# Patient Record
Sex: MALE | Race: BLACK OR AFRICAN AMERICAN | Employment: FULL TIME | URBAN - METROPOLITAN AREA
[De-identification: names, ages, dates, MRNs, and addresses within clinical notes are randomized per-mention and may not be internally consistent; named-entity substitution may affect disease eponyms.]

---

## 2021-07-11 NOTE — PROGRESS NOTES
Olivia Ron   63 y.o. male   1965    This is patient's first visit with me. Olivia Ron is here to establish care as their new PCP. Olivia Ron has a PMH significant for: none    Reason(s) for visit:   Chief Complaint   Patient presents with    Annual Exam    Established New Doctor       HPI:    Chart review:  -Nothing on file. Office visit encounter:    Patient is here for physical.  Regarding his occupation. Patient operates a tow truck. He stated that he had a  DOT over a year ago. Patient stated he has had intermittent issues of initiating urinary stream, but no issues with incomplete bladder emptying, dysuria, hematuria, incontinence, etc.    Regarding colon CA, no FMH of colon CA. He had at least two c-scope in his 51's in [facility]. No issues with hematochezia or melena. Patient stated that he got his COVID-19 vaccine about 4-5 months ago. No hx of confirmed COVID-19. PDMP monitoring:  -Revealed no controlled medications written of any kind.    -Last report:   Last PDMP [place] as Reviewed [facility]):  Review User Review Instant Review Result   [address] [address], [address] 7/11/2021  1:23 PM Reviewed PDMP [1]       Allergies   Allergen Reactions    Atorvastatin      Other reaction(s): Muscle Aches       No current outpatient medications on file prior to visit. No current facility-administered medications on file prior to visit.         Family History   Problem Relation Age of Onset    Breast Cancer Mother        Social History     Tobacco Use    Smoking status: Never Smoker    Smokeless tobacco: Never Used   Substance Use Topics    Alcohol use: Yes        No results found for: WBC, HGB, HCT, MCV, PLT    Chemistry    No results found for: NA, K, CL, CO2, BUN, CREATININE No results found for: CALCIUM, ALKPHOS, AST, ALT, BILITOT       No results found for: ALT, AST, GGT, ALKPHOS, BILITOT  No results found for: LABA1C  No results found for: EAG    Review of Systems Constitutional: Negative for activity change, appetite change, fatigue, fever and unexpected weight change. HENT: Negative for congestion, rhinorrhea, sinus pressure and trouble swallowing. Respiratory: Negative for cough, chest tightness, shortness of breath and wheezing. Cardiovascular: Negative for chest pain, palpitations and leg swelling. Gastrointestinal: Negative for abdominal distention, abdominal pain, blood in stool, constipation, diarrhea, nausea and vomiting. Genitourinary: Positive for difficulty urinating (intermittent). Negative for dysuria, frequency and hematuria. Musculoskeletal: Negative for arthralgias and back pain. Skin: Negative for rash. Neurological: Negative for dizziness, weakness, light-headedness, numbness and headaches. Psychiatric/Behavioral: Negative for sleep disturbance. The patient is not nervous/anxious. Wt Readings from Last 3 Encounters:   07/14/21 225 lb 9.6 oz (102.3 kg)       BP Readings from Last 3 Encounters:   07/14/21 138/84       Pulse Readings from Last 3 Encounters:   07/14/21 70       /84   Pulse 70   Temp 97.4 °F (36.3 °C)   Ht 5' 8\" (1.727 m)   Wt 225 lb 9.6 oz (102.3 kg)   SpO2 96%   BMI 34.30 kg/m²      Physical Exam  Vitals reviewed. Constitutional:       General: He is awake. He is not in acute distress. Appearance: He is obese. He is not ill-appearing or diaphoretic. HENT:      Head: Normocephalic and atraumatic. No abrasion or masses. Hair is normal.      Right Ear: External ear normal.      Left Ear: External ear normal.      Nose: Nose normal.   Eyes:      General: Lids are normal. Gaze aligned appropriately. No scleral icterus. Right eye: No discharge. Left eye: No discharge. Extraocular Movements: Extraocular movements intact. Conjunctiva/sclera: Conjunctivae normal.   Neck:      Trachea: Phonation normal.   Cardiovascular:      Rate and Rhythm: Normal rate and regular rhythm. Pulmonary:      Effort: Pulmonary effort is normal. No respiratory distress. Breath sounds: No wheezing, rhonchi or rales. Abdominal:      General: Abdomen is flat. There is no distension. Palpations: Abdomen is soft. Musculoskeletal:         General: No deformity. Normal range of motion. Cervical back: Normal range of motion. No erythema. Right lower leg: No edema. Left lower leg: No edema. Skin:     Coloration: Skin is not cyanotic, jaundiced or pale. Findings: No abrasion, abscess, bruising, ecchymosis, erythema, signs of injury, laceration, lesion, petechiae, rash or wound. Neurological:      General: No focal deficit present. Mental Status: He is alert. Mental status is at baseline. GCS: GCS eye subscore is 4. GCS verbal subscore is 5. GCS motor subscore is 6. Cranial Nerves: No cranial nerve deficit, dysarthria or facial asymmetry. Motor: No weakness, tremor, atrophy or seizure activity. Coordination: Coordination normal.      Gait: Gait is intact. Psychiatric:         Attention and Perception: Attention and perception normal.         Mood and Affect: Mood and affect normal.         Speech: Speech normal.         Behavior: Behavior normal. Behavior is cooperative. Thought Content: Thought content normal.       Assessment/Plan:   Neal Saravia was seen today for annual exam and established new doctor. Diagnoses and all orders for this visit:    Encounter to establish care with new doctor    Encounter for preventative adult health care examination  -     CBC Auto Differential; Future  -     Hemoglobin A1C; Future  -     TSH without Reflex; Future  -     T4, Free; Future  -     Renal Function Panel; Future  -     Hepatic Function Panel; Future  -     Lipid Panel;  Future  -     Lipid Panel  -     Hepatic Function Panel  -     Renal Function Panel  -     T4, Free  -     TSH without Reflex  -     Hemoglobin A1C  -     CBC Auto Differential    Slowing of urinary stream  Comments:  Advised to monitor. No overt signs of BPH. No indication for prostate med at this point in time. I reviewed the plan of care with the patient. Patient acknowledged understanding and agreed with plan of care overall. There are no discontinued medications. General information on medications:  -When it comes to medications, whether with starting or adding a new medication or increasing the dose of a current medication, the benefits and risks have to always be considered and weighed over, especially if one is taking other medications as well. -There are no medications that have no side effects and that there is always a risk involved with taking a medication.    -If a side effect were to occur with starting a new medication or with increasing the dose of a current medication that either the medication can be totally discontinued altogether or simply decrease the dose of it and if this would be the case a follow-up appointment would be deemed necessary.    -The drug allergy list will then be updated with the corresponding side effect(s) if it's deemed to be a true 'drug allergy'. -The most common adverse effects of medication(s) were addressed at today's visit.    -Lastly, the coverage status of a medication may vary from insurance to insurance and the only way to verify if the medication is covered is to send an actual prescription in.    -The drug formulary of each insurance changes without any warning or notification to the healthcare provider let alone the pharmacy.  -The cost of medications vary from insurance to insurance and the cost is always subject to change just like the drug formulary.     Level of service (LOS):   -Multiple variables/factors are considered with determining LOS: duration of time spent with patient, time spent reviewing patient's labs, notes (including my own and other specialist's notes if relevant), number of issues addressed during the visit, answering any questions/concerns brought up by the patient, and the overall complexity and decision making regarding the issues addressed during the visit, etc.  -Length of visit is one factor (not the main one) in determining the LOS code selected. -There are different codes to distinguish new patient vs old (established) patient.  -There is a specific LOS/code for a (annual) physical (otherwise known as a wellness visit or biometric screening) can only be used once a year. In addition, the healthcare provider will determine whether the current visit can be considered a 'physical.' Discussing about specific issues/concerns, especially if those issues/concerns are new does not constitute as a 'physical' visit. Follow-up: Return in about 1 year (around 7/14/2022) for annual physical..     Patient was informed that if his or her symptoms worsen to follow up with me sooner or go to the nearest ER if the symptoms are very significant and warrant higher level of care. Regarding my note:  -This note was composed (by me only and not with assistance via a scribe) to the best of my knowledge and recollection of the encounter with the patient using one of my own customized note templates utilizing a combination of typing and dictating with the [address] [place] [address] speech recognition software. As a result, the note may possibly have various errors (e.g. spelling, grammar, and non-sensible words/phrases/statements) despite reviewing the note prior to signing it for completion.    -It is worth noting that most of the time, progress notes are completed usually long after the patient was seen. Every effort is made to have notes as well as other things that needed to be attended to (e.g. medication refills, MyChart messages, documents that require reviewing, etc.) be addressed and completed in a timely fashion (ideally within 72 hours of the patient encounter).     -It is also worth noting that healthcare providers often see several patients a day (e.g. > 15-30 patients/day) in either the outpatient and/or inpatient setting(s). In addition, healthcare providers spend a significant amount of time reviewing multiple patients' charts in preparation for their future encounters (pre-charting) as well as time spent reviewing any labs, imaging, specialist's notes (if relevant), and other documents (e.g. recent ER visits or hospital stays or completing forms such as FMLA, short-term/long-term disability), etc.  Time is also allocated to attending to patient's messages on [employer], phone calls from various people, attending to prescription refills/orders, and also attending meetings or conferences throughout the day. Time and effort is also allocated to coordinating with office staff to make sure various things are completed as part of the day-to-day office management responsibilities. For the most part, substantial portion of each given day is usually spent with direct patient care followed by documenting.  -Given all this, it is worth pointing out that not every detail of the encounter can be remembered and not everything discussed is considered relevant, significant, or noteworthy. It is also worth mentioning that my recollection of the visit may differ from the respective patient's recollection of the visit. This note is primarily written for myself (the healthcare provider) utilizing one of my own customized templates so I can recall what happened during that visit and may be used for future reference for myself to prepare for follow up appointments. My note is also written in mind for other healthcare professionals (who have their own extensive medical background and experience) for their own understanding (of what happened during the visit) and also more importantly to hopefully help influence and play a role in formulating their plan of care along with their own unique medical expertise. If one has any questions or concerns about my given note, please take into consideration all these aforementioned things before contacting. Parish Ignacio M.D.   [address]    Electronically signed by Wendy aBrros on 7/14/2021 at 9:34 AM.

## 2021-07-14 ENCOUNTER — OFFICE VISIT (OUTPATIENT)
Dept: FAMILY MEDICINE CLINIC | Age: 56
End: 2021-07-14
Payer: COMMERCIAL

## 2021-07-14 VITALS
WEIGHT: 225.6 LBS | OXYGEN SATURATION: 96 % | TEMPERATURE: 97.4 F | SYSTOLIC BLOOD PRESSURE: 138 MMHG | BODY MASS INDEX: 34.19 KG/M2 | HEART RATE: 70 BPM | DIASTOLIC BLOOD PRESSURE: 84 MMHG | HEIGHT: 68 IN

## 2021-07-14 DIAGNOSIS — R39.198 SLOWING OF URINARY STREAM: ICD-10-CM

## 2021-07-14 DIAGNOSIS — Z00.00 ENCOUNTER FOR PREVENTATIVE ADULT HEALTH CARE EXAMINATION: ICD-10-CM

## 2021-07-14 DIAGNOSIS — Z76.89 ENCOUNTER TO ESTABLISH CARE WITH NEW DOCTOR: Primary | ICD-10-CM

## 2021-07-14 LAB
BASOPHILS ABSOLUTE: 0 K/UL (ref 0–0.2)
BASOPHILS RELATIVE PERCENT: 0.8 %
EOSINOPHILS ABSOLUTE: 0.3 K/UL (ref 0–0.6)
EOSINOPHILS RELATIVE PERCENT: 4.9 %
HCT VFR BLD CALC: 42.2 % (ref 40.5–52.5)
HEMOGLOBIN: 13.8 G/DL (ref 13.5–17.5)
LYMPHOCYTES ABSOLUTE: 2.3 K/UL (ref 1–5.1)
LYMPHOCYTES RELATIVE PERCENT: 40.3 %
MCH RBC QN AUTO: 29 PG (ref 26–34)
MCHC RBC AUTO-ENTMCNC: 32.6 G/DL (ref 31–36)
MCV RBC AUTO: 88.9 FL (ref 80–100)
MONOCYTES ABSOLUTE: 0.4 K/UL (ref 0–1.3)
MONOCYTES RELATIVE PERCENT: 7.5 %
NEUTROPHILS ABSOLUTE: 2.7 K/UL (ref 1.7–7.7)
NEUTROPHILS RELATIVE PERCENT: 46.5 %
PDW BLD-RTO: 14.3 % (ref 12.4–15.4)
PLATELET # BLD: 100 K/UL (ref 135–450)
PMV BLD AUTO: 11.1 FL (ref 5–10.5)
RBC # BLD: 4.75 M/UL (ref 4.2–5.9)
WBC # BLD: 5.8 K/UL (ref 4–11)

## 2021-07-14 PROCEDURE — 99386 PREV VISIT NEW AGE 40-64: CPT | Performed by: FAMILY MEDICINE

## 2021-07-14 ASSESSMENT — ENCOUNTER SYMPTOMS
DIARRHEA: 0
ABDOMINAL DISTENTION: 0
TROUBLE SWALLOWING: 0
SHORTNESS OF BREATH: 0
BLOOD IN STOOL: 0
NAUSEA: 0
COUGH: 0
CHEST TIGHTNESS: 0
WHEEZING: 0
VOMITING: 0
ABDOMINAL PAIN: 0
SINUS PRESSURE: 0
RHINORRHEA: 0
BACK PAIN: 0
CONSTIPATION: 0

## 2021-07-14 ASSESSMENT — PATIENT HEALTH QUESTIONNAIRE - PHQ9
SUM OF ALL RESPONSES TO PHQ QUESTIONS 1-9: 0
SUM OF ALL RESPONSES TO PHQ9 QUESTIONS 1 & 2: 0
SUM OF ALL RESPONSES TO PHQ QUESTIONS 1-9: 0
SUM OF ALL RESPONSES TO PHQ QUESTIONS 1-9: 0
1. LITTLE INTEREST OR PLEASURE IN DOING THINGS: 0
2. FEELING DOWN, DEPRESSED OR HOPELESS: 0

## 2021-07-15 PROBLEM — E66.09 CLASS 1 OBESITY DUE TO EXCESS CALORIES WITHOUT SERIOUS COMORBIDITY WITH BODY MASS INDEX (BMI) OF 34.0 TO 34.9 IN ADULT: Status: ACTIVE | Noted: 2021-07-15

## 2021-07-15 PROBLEM — E66.811 CLASS 1 OBESITY DUE TO EXCESS CALORIES WITHOUT SERIOUS COMORBIDITY WITH BODY MASS INDEX (BMI) OF 34.0 TO 34.9 IN ADULT: Status: ACTIVE | Noted: 2021-07-15

## 2021-07-15 PROBLEM — E78.2 MIXED HYPERLIPIDEMIA: Status: ACTIVE | Noted: 2021-07-15

## 2021-07-15 PROBLEM — E11.65 UNCONTROLLED TYPE 2 DIABETES MELLITUS WITH HYPERGLYCEMIA (HCC): Status: ACTIVE | Noted: 2021-07-15

## 2021-07-15 PROBLEM — R73.09 HEMOGLOBIN A1C GREATER THAN 9%, INDICATING POOR DIABETIC CONTROL: Status: ACTIVE | Noted: 2021-07-15

## 2021-07-15 LAB
ALBUMIN SERPL-MCNC: 4.4 G/DL (ref 3.4–5)
ALP BLD-CCNC: 94 U/L (ref 40–129)
ALT SERPL-CCNC: 19 U/L (ref 10–40)
ANION GAP SERPL CALCULATED.3IONS-SCNC: 13 MMOL/L (ref 3–16)
AST SERPL-CCNC: 23 U/L (ref 15–37)
BILIRUB SERPL-MCNC: 0.3 MG/DL (ref 0–1)
BILIRUBIN DIRECT: <0.2 MG/DL (ref 0–0.3)
BILIRUBIN, INDIRECT: NORMAL MG/DL (ref 0–1)
BUN BLDV-MCNC: 13 MG/DL (ref 7–20)
CALCIUM SERPL-MCNC: 9.5 MG/DL (ref 8.3–10.6)
CHLORIDE BLD-SCNC: 96 MMOL/L (ref 99–110)
CHOLESTEROL, TOTAL: 347 MG/DL (ref 0–199)
CO2: 24 MMOL/L (ref 21–32)
CREAT SERPL-MCNC: 0.9 MG/DL (ref 0.9–1.3)
ESTIMATED AVERAGE GLUCOSE: 274.7 MG/DL
GFR AFRICAN AMERICAN: >60
GFR NON-AFRICAN AMERICAN: >60
GLUCOSE BLD-MCNC: 257 MG/DL (ref 70–99)
HBA1C MFR BLD: 11.2 %
HDLC SERPL-MCNC: 41 MG/DL (ref 40–60)
LDL CHOLESTEROL CALCULATED: 267 MG/DL
PHOSPHORUS: 2.8 MG/DL (ref 2.5–4.9)
POTASSIUM SERPL-SCNC: 4.8 MMOL/L (ref 3.5–5.1)
SODIUM BLD-SCNC: 133 MMOL/L (ref 136–145)
T4 FREE: 1 NG/DL (ref 0.9–1.8)
TOTAL PROTEIN: 7.4 G/DL (ref 6.4–8.2)
TRIGL SERPL-MCNC: 197 MG/DL (ref 0–150)
TSH SERPL DL<=0.05 MIU/L-ACNC: 1.13 UIU/ML (ref 0.27–4.2)
VLDLC SERPL CALC-MCNC: 39 MG/DL

## 2021-07-17 PROBLEM — E78.00 HYPERCHOLESTEROLEMIA WITH LDL GREATER THAN 190 MG/DL: Status: ACTIVE | Noted: 2021-07-17

## 2021-07-17 PROBLEM — D69.6 THROMBOCYTOPENIA (HCC): Status: ACTIVE | Noted: 2021-07-17

## 2021-07-17 NOTE — PROGRESS NOTES
Daniel Marrow   63 y.o. male   1965    HPI:    Patient was last seen by me on 7/14/2021. During our last office visit:   -This was his first visit with me to establish care as his new PCP. Reason(s) for visit:   Chief Complaint   Patient presents with    Diabetes    Discuss Medications       Patient came with his wife. She works as a nursing director. Reviewed patient's lab work with him. Only lab work on file for comparison was Nov 2019. Most notably his A1c was 11.2. Glucose was 257. CBC was notable for PLT of 100k (114k). Lipid panel showed total cholesterol 347, (333), triglycerides 197 (134), and  (265). Nov 2019 labs were notable for Hep C negative. Normal PSA. Neither one had questions or issues/concerns that they want to share. Patient received COVID-19 vaccine in February and is fully vaccinated. Declined pneumonia vaccine. Allergies   Allergen Reactions    Atorvastatin      Other reaction(s): Muscle Aches       No current outpatient medications on file prior to visit. No current facility-administered medications on file prior to visit.         Family History   Problem Relation Age of Onset    Breast Cancer Mother        Social History     Tobacco Use    Smoking status: Never Smoker    Smokeless tobacco: Never Used   Substance Use Topics    Alcohol use: Yes        Lab Results   Component Value Date    WBC 5.8 07/14/2021    HGB 13.8 07/14/2021    HCT 42.2 07/14/2021    MCV 88.9 07/14/2021     (L) 07/14/2021       Chemistry        Component Value Date/Time     (L) 07/14/2021 0830    K 4.8 07/14/2021 0830    CL 96 (L) 07/14/2021 0830    CO2 24 07/14/2021 0830    BUN 13 07/14/2021 0830    CREATININE 0.9 07/14/2021 0830        Component Value Date/Time    CALCIUM 9.5 07/14/2021 0830    ALKPHOS 94 07/14/2021 0830    AST 23 07/14/2021 0830    ALT 19 07/14/2021 0830    BILITOT 0.3 07/14/2021 0830          Lab Results   Component Value Date    ALT 19 07/14/2021    AST 23 07/14/2021    ALKPHOS 94 07/14/2021    BILITOT 0.3 07/14/2021     Lab Results   Component Value Date    LABA1C 11.2 07/14/2021     Lab Results   Component Value Date    .7 07/14/2021       Review of Systems   Constitutional: Negative for activity change, appetite change, fatigue, fever and unexpected weight change. HENT: Negative for congestion, rhinorrhea, sinus pressure and trouble swallowing. Respiratory: Negative for cough, chest tightness, shortness of breath and wheezing. Cardiovascular: Negative for chest pain, palpitations and leg swelling. Gastrointestinal: Negative for abdominal distention, abdominal pain, blood in stool, constipation, diarrhea, nausea and vomiting. Genitourinary: Negative for dysuria, frequency and hematuria. Musculoskeletal: Negative for arthralgias and back pain. Skin: Negative for rash. Neurological: Negative for dizziness, weakness, light-headedness, numbness and headaches. Wt Readings from Last 3 Encounters:   07/19/21 225 lb 12.8 oz (102.4 kg)   07/14/21 225 lb 9.6 oz (102.3 kg)       BP Readings from Last 3 Encounters:   07/19/21 126/82   07/14/21 138/84       Pulse Readings from Last 3 Encounters:   07/19/21 79   07/14/21 70       /82   Pulse 79   Temp 97.6 °F (36.4 °C)   Ht 5' 8\" (1.727 m)   Wt 225 lb 12.8 oz (102.4 kg)   SpO2 98%   BMI 34.33 kg/m²      Physical Exam  Vitals reviewed. Constitutional:       General: He is awake. He is not in acute distress. Appearance: He is obese. He is not ill-appearing or diaphoretic. HENT:      Head: Normocephalic and atraumatic. No abrasion or masses. Hair is normal.      Right Ear: External ear normal.      Left Ear: External ear normal.      Nose: Nose normal.   Eyes:      General: Lids are normal. Gaze aligned appropriately. No scleral icterus. Right eye: No discharge. Left eye: No discharge. Extraocular Movements: Extraocular movements intact. Conjunctiva/sclera: Conjunctivae normal.   Neck:      Trachea: Phonation normal.   Cardiovascular:      Rate and Rhythm: Normal rate and regular rhythm. Pulmonary:      Effort: Pulmonary effort is normal. No respiratory distress. Breath sounds: No wheezing, rhonchi or rales. Abdominal:      General: Abdomen is flat. There is no distension. Palpations: Abdomen is soft. Musculoskeletal:         General: No deformity. Normal range of motion. Cervical back: Normal range of motion. No erythema. Right lower leg: No edema. Left lower leg: No edema. Skin:     Coloration: Skin is not cyanotic, jaundiced or pale. Findings: No abrasion, abscess, bruising, ecchymosis, erythema, signs of injury, laceration, lesion, petechiae, rash or wound. Neurological:      General: No focal deficit present. Mental Status: He is alert. Mental status is at baseline. GCS: GCS eye subscore is 4. GCS verbal subscore is 5. GCS motor subscore is 6. Cranial Nerves: No cranial nerve deficit, dysarthria or facial asymmetry. Motor: No weakness, tremor, atrophy or seizure activity. Coordination: Coordination normal.      Gait: Gait is intact. Psychiatric:         Attention and Perception: Attention and perception normal.         Mood and Affect: Mood and affect normal.         Speech: Speech normal.         Behavior: Behavior normal. Behavior is cooperative. Thought Content: Thought content normal.       Assessment/Plan:   Sangeeta Montalvo was seen today for diabetes and discuss medications. Diagnoses and all orders for this visit:    Uncontrolled type 2 diabetes mellitus with hyperglycemia ([facility] [address]  Comments:  I discussed with patient that having diabetes requires a major lifestyle change.   Extensive counseling was given to the patient, who was informed of the microvascular and macrovascular complications of diabetes: increased risk of CAD, MI, CVA, CKD/ESRD (leading to dialysis), diabetic retinopathy, diabetic gastroparesis, diabetic neuropathy, chronic wound infections (eg osteomyelitis), etc.      Patient was informed that diabetics need to be seen every 3 months for routine A1c blood testing and that the goal of A1c is under 7.0 (and under 6.5 for more aggressive treatment) for most patients and under 8.5 for the elderly. Patient was counseled also to take the medications as prescribed and to check glucose as directed, especially if they're symptomatic (malaise, weak, fatigue, clammy, dizzy, etc.) and to keep food/beverage with him at all times in case his glucose is low. Patient was also advised, especially in the setting of severe neuropathy with numbness to never walk around barefoot. Informed patient of benefits of being on GLP1 & SGLT2 therapy that go beyond glycemic control - low risk of hypoglycemia, weight loss, decreased risk of cardiovascular complications, etc.  Orders:  -     Urinalysis; Future  -     MICROALBUMIN / CREATININE URINE RATIO; Future  -     metFORMIN (GLUCOPHAGE) 500 MG tablet; Take 1 tablet by mouth 2 times daily (with meals)  -     Dulaglutide 0.75 MG/0.5ML SOPN; Inject 0.75 mg into the skin once a week  -     dapagliflozin (FARXIGA) 10 MG tablet; Take 1 tablet by mouth every morning  -     Lancets Misc. MISC; Use with glucometer  -     blood glucose monitor strips; Test 1 time a day prior to breakfast & as needed for symptoms of irregular blood glucose.  -     glucose monitoring (FREESTYLE FREEDOM) kit; 1 kit by Does not apply route daily Use whichever is covered by insurance  -     MICROALBUMIN / CREATININE URINE RATIO  -     Urinalysis    Hemoglobin A1C greater than 9%, indicating poor diabetic control  Comments:  Patient declined referral to diabetic educator. Will try to avoid insulin if at all possible. Informed patient that we really need to be aggressive about his therapy with Metformin, GLP-1, and SGLT-2 triple therapy right away.   Informed patient that \"corrective lifestyle measures\" is insufficient with treating his diabetes. Orders:  -     Urinalysis; Future  -     MICROALBUMIN / CREATININE URINE RATIO; Future  -     metFORMIN (GLUCOPHAGE) 500 MG tablet; Take 1 tablet by mouth 2 times daily (with meals)  -     Dulaglutide 0.75 MG/0.5ML SOPN; Inject 0.75 mg into the skin once a week  -     dapagliflozin (FARXIGA) 10 MG tablet; Take 1 tablet by mouth every morning  -     Lancets Misc. MISC; Use with glucometer  -     blood glucose monitor strips; Test 1 time a day prior to breakfast & as needed for symptoms of irregular blood glucose.  -     glucose monitoring (FREESTYLE FREEDOM) kit; 1 kit by Does not apply route daily Use whichever is covered by insurance  -     MICROALBUMIN / CREATININE URINE RATIO  -     Urinalysis    Mixed hyperlipidemia  -     rosuvastatin (CRESTOR) 10 MG tablet; Take 1 tablet by mouth daily  -     ezetimibe (ZETIA) 10 MG tablet; Take 1 tablet by mouth daily    Hypercholesterolemia with LDL greater than 190 mg/dL  -     rosuvastatin (CRESTOR) 10 MG tablet; Take 1 tablet by mouth daily  -     ezetimibe (ZETIA) 10 MG tablet; Take 1 tablet by mouth daily    Thrombocytopenia ([address]  Comments:  Provide patient reassurance. 100 K may be his baseline normal.  Orders:  -     CBC Auto Differential; Future  -     Path Review, Smear; Future  -     Path Review, Smear  -     CBC Auto Differential    Hepatitis B vaccination status unknown  -     HEPATITIS B SURFACE ANTIBODY; Future  -     HEPATITIS B SURFACE ANTIBODY    Class 1 obesity due to excess calories with serious comorbidity and body mass index (BMI) of 34.0 to 34.9 in adult  Comments:  Patient was advised to monitor caloric/sugar/carb intake, consume small portion sizes, well-balanced meals (moraima with fruits & vegetables), and exercise at least 150 min/week.  We discussed also reviewing nutritional labels before purchasing a product and for simplicity sake, purchase the food product that has overall less of everything. It is also recommended to avoid frequent snacking and late night eating. I reviewed the plan of care with the patient. Patient acknowledged understanding and agreed with plan of care overall. There are no discontinued medications. General information on medications:  -When it comes to medications, whether with starting or adding a new medication or increasing the dose of a current medication, the benefits and risks have to always be considered and weighed over, especially if one is taking other medications as well. -There are no medications that have no side effects and that there is always a risk involved with taking a medication.    -If a side effect were to occur with starting a new medication or with increasing the dose of a current medication that either the medication can be totally discontinued altogether or simply decrease the dose of it and if this would be the case a follow-up appointment would be deemed necessary.    -The drug allergy list will then be updated with the corresponding side effect(s) if it's deemed to be a true 'drug allergy'. -The most common adverse effects of medication(s) were addressed at today's visit.    -Lastly, the coverage status of a medication may vary from insurance to insurance and the only way to verify if the medication is covered is to send an actual prescription in.    -The drug formulary of each insurance changes without any warning or notification to the healthcare provider let alone the pharmacy.  -The cost of medications vary from insurance to insurance and the cost is always subject to change just like the drug formulary. Level of service (LOS):   -Duration: 30 minutes - time was spent in detail talking about the long-term complications of diabetes as well as the various medications used to treat diabetes.   -Multiple variables/factors are considered with determining LOS: duration of time spent with patient, time spent reviewing patient's labs, notes (including my own and other specialist's notes if relevant), number of issues addressed during the visit, answering any questions/concerns brought up by the patient, and the overall complexity and decision making regarding the issues addressed during the visit, etc.  -Length of visit is one factor (not the main one) in determining the LOS code selected. -There are different codes to distinguish new patient vs old (established) patient.  -There is a specific LOS/code for a (annual) physical (otherwise known as a wellness visit or biometric screening) can only be used once a year. In addition, the healthcare provider will determine whether the current visit can be considered a 'physical.' Discussing about specific issues/concerns, especially if those issues/concerns are new does not constitute as a 'physical' visit. Follow-up: Return in about 4 weeks (around 8/16/2021) for IP - , DM routine 3 mo visit. .     Patient was informed that if his or her symptoms worsen to follow up with me sooner or go to the nearest ER if the symptoms are very significant and warrant higher level of care. Regarding my note:  -This note was composed (by me only and not with assistance via a scribe) to the best of my knowledge and recollection of the encounter with the patient using one of my own customized note templates utilizing a combination of typing and dictating with the [address] [place] [id number] speech recognition software. As a result, the note may possibly have various errors (e.g. spelling, grammar, and non-sensible words/phrases/statements) despite reviewing the note prior to signing it for completion.    -It is worth noting that most of the time, progress notes are completed usually long after the patient was seen.   Every effort is made to have notes as well as other things that needed to be attended to (e.g. medication refills, MyChart messages, documents that require reviewing, etc.) be addressed and completed in a timely fashion (ideally within 72 hours of the patient encounter). -It is also worth noting that healthcare providers often see several patients a day (e.g. > 15-30 patients/day) in either the outpatient and/or inpatient setting(s). In addition, healthcare providers spend a significant amount of time reviewing multiple patients' charts in preparation for their future encounters (pre-charting) as well as time spent reviewing any labs, imaging, specialist's notes (if relevant), and other documents (e.g. recent ER visits or hospital stays or completing forms such as FMLA, short-term/long-term disability), etc.  Time is also allocated to attending to patient's messages on [employer], phone calls from various people, attending to prescription refills/orders, and also attending meetings or conferences throughout the day. Time and effort is also allocated to coordinating with office staff to make sure various things are completed as part of the day-to-day office management responsibilities. For the most part, substantial portion of each given day is usually spent with direct patient care followed by documenting.  -Given all this, it is worth pointing out that not every detail of the encounter can be remembered and not everything discussed is considered relevant, significant, or noteworthy. It is also worth mentioning that my recollection of the visit may differ from the respective patient's recollection of the visit. This note is primarily written for myself (the healthcare provider) utilizing one of my own customized templates so I can recall what happened during that visit and may be used for future reference for myself to prepare for follow up appointments.   My note is also written in mind for other healthcare professionals (who have their own extensive medical background and experience) for their own understanding (of what happened during the visit) and also more

## 2021-07-19 ENCOUNTER — OFFICE VISIT (OUTPATIENT)
Dept: FAMILY MEDICINE CLINIC | Age: 56
End: 2021-07-19
Payer: COMMERCIAL

## 2021-07-19 VITALS
OXYGEN SATURATION: 98 % | HEIGHT: 68 IN | TEMPERATURE: 97.6 F | DIASTOLIC BLOOD PRESSURE: 82 MMHG | SYSTOLIC BLOOD PRESSURE: 126 MMHG | BODY MASS INDEX: 34.22 KG/M2 | WEIGHT: 225.8 LBS | HEART RATE: 79 BPM

## 2021-07-19 DIAGNOSIS — E78.00 HYPERCHOLESTEROLEMIA WITH LDL GREATER THAN 190 MG/DL: ICD-10-CM

## 2021-07-19 DIAGNOSIS — E11.65 UNCONTROLLED TYPE 2 DIABETES MELLITUS WITH HYPERGLYCEMIA (HCC): Primary | ICD-10-CM

## 2021-07-19 DIAGNOSIS — E78.2 MIXED HYPERLIPIDEMIA: ICD-10-CM

## 2021-07-19 DIAGNOSIS — Z78.9 HEPATITIS B VACCINATION STATUS UNKNOWN: ICD-10-CM

## 2021-07-19 DIAGNOSIS — R73.09 HEMOGLOBIN A1C GREATER THAN 9%, INDICATING POOR DIABETIC CONTROL: ICD-10-CM

## 2021-07-19 DIAGNOSIS — D69.6 THROMBOCYTOPENIA (HCC): ICD-10-CM

## 2021-07-19 DIAGNOSIS — E66.09 CLASS 1 OBESITY DUE TO EXCESS CALORIES WITH SERIOUS COMORBIDITY AND BODY MASS INDEX (BMI) OF 34.0 TO 34.9 IN ADULT: ICD-10-CM

## 2021-07-19 LAB
BASOPHILS ABSOLUTE: 0 K/UL (ref 0–0.2)
BASOPHILS RELATIVE PERCENT: 0.6 %
BILIRUBIN URINE: NEGATIVE
BLOOD SMEAR REVIEW: NORMAL
BLOOD, URINE: NEGATIVE
CLARITY: CLEAR
COLOR: YELLOW
CREATININE URINE: 174 MG/DL (ref 39–259)
EOSINOPHILS ABSOLUTE: 0.3 K/UL (ref 0–0.6)
EOSINOPHILS RELATIVE PERCENT: 4.8 %
GLUCOSE URINE: >=1000 MG/DL
HBV SURFACE AB TITR SER: <3.5 MIU/ML
HCT VFR BLD CALC: 41.2 % (ref 40.5–52.5)
HEMOGLOBIN: 13.5 G/DL (ref 13.5–17.5)
KETONES, URINE: NEGATIVE MG/DL
LEUKOCYTE ESTERASE, URINE: NEGATIVE
LYMPHOCYTES ABSOLUTE: 2.7 K/UL (ref 1–5.1)
LYMPHOCYTES RELATIVE PERCENT: 42.2 %
MCH RBC QN AUTO: 29 PG (ref 26–34)
MCHC RBC AUTO-ENTMCNC: 32.7 G/DL (ref 31–36)
MCV RBC AUTO: 88.6 FL (ref 80–100)
MICROALBUMIN UR-MCNC: 4.1 MG/DL
MICROALBUMIN/CREAT UR-RTO: 23.6 MG/G (ref 0–30)
MICROSCOPIC EXAMINATION: ABNORMAL
MONOCYTES ABSOLUTE: 0.5 K/UL (ref 0–1.3)
MONOCYTES RELATIVE PERCENT: 7.3 %
NEUTROPHILS ABSOLUTE: 2.9 K/UL (ref 1.7–7.7)
NEUTROPHILS RELATIVE PERCENT: 45.1 %
NITRITE, URINE: NEGATIVE
PDW BLD-RTO: 14.5 % (ref 12.4–15.4)
PH UA: 6 (ref 5–8)
PLATELET # BLD: 99 K/UL (ref 135–450)
PMV BLD AUTO: 10.9 FL (ref 5–10.5)
PROTEIN UA: NEGATIVE MG/DL
RBC # BLD: 4.66 M/UL (ref 4.2–5.9)
SPECIFIC GRAVITY UA: >1.03 (ref 1–1.03)
URINE TYPE: ABNORMAL
UROBILINOGEN, URINE: 0.2 E.U./DL
WBC # BLD: 6.4 K/UL (ref 4–11)

## 2021-07-19 PROCEDURE — 99214 OFFICE O/P EST MOD 30 MIN: CPT | Performed by: FAMILY MEDICINE

## 2021-07-19 RX ORDER — ROSUVASTATIN CALCIUM 10 MG/1
10 TABLET, COATED ORAL DAILY
Qty: 30 TABLET | Refills: 5 | Status: SHIPPED | OUTPATIENT
Start: 2021-07-19 | End: 2021-12-15 | Stop reason: SDUPTHER

## 2021-07-19 RX ORDER — BLOOD-GLUCOSE METER
1 KIT MISCELLANEOUS DAILY
Qty: 1 KIT | Refills: 0 | Status: SHIPPED | OUTPATIENT
Start: 2021-07-19

## 2021-07-19 RX ORDER — GLUCOSAMINE HCL/CHONDROITIN SU 500-400 MG
CAPSULE ORAL
Qty: 100 STRIP | Refills: 5 | Status: SHIPPED | OUTPATIENT
Start: 2021-07-19

## 2021-07-19 RX ORDER — EZETIMIBE 10 MG/1
10 TABLET ORAL DAILY
Qty: 90 TABLET | Refills: 3 | Status: SHIPPED | OUTPATIENT
Start: 2021-07-19 | End: 2022-07-25 | Stop reason: SDUPTHER

## 2021-07-19 ASSESSMENT — ENCOUNTER SYMPTOMS
ABDOMINAL PAIN: 0
CONSTIPATION: 0
COUGH: 0
TROUBLE SWALLOWING: 0
BLOOD IN STOOL: 0
CHEST TIGHTNESS: 0
SINUS PRESSURE: 0
SHORTNESS OF BREATH: 0
ABDOMINAL DISTENTION: 0
WHEEZING: 0
NAUSEA: 0
BACK PAIN: 0
RHINORRHEA: 0
VOMITING: 0
DIARRHEA: 0

## 2021-07-20 LAB — HEMATOLOGY PATH CONSULT: NORMAL

## 2021-08-13 NOTE — PROGRESS NOTES
Daxa Newell   61 y.o. male   1965    HPI:    Patient was last seen by me on 2021. During our last office visit:   -Started patient on diabetes meds listed below. He's a new-onset diabetic. Reason(s) for visit:   Chief Complaint   Patient presents with    Diabetes    Follow-up       Type II diabetes mellitus:  Regarding diabetes, patient is on medications as noted below in his medication list. Since the last visit. ..  -Last A1c = 11.2 (2021)  -Glucose readings (on average): fastin-100  -Glucose reading: low: low 90's   -Episodes of hypoglycemia: reading: feels horrible b/w   -Medication adherence: yes    Right shoulder arthritis:  -Duration: 2 weeks  -No inciting event. -Meds: has tried topical analgesics (doesn't know what) and massages area. Has also tried Ibuprofen-PM (?). No relief.  -Denied radiculopathy, crepitus, popping sensation  -Hurts lying on his right side.  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 Allergies   Allergen Reactions    Atorvastatin      Other reaction(s): Muscle Aches       Current Outpatient Medications on File Prior to Visit   Medication Sig Dispense Refill    FreeStyle Lancets MISC USE WITH GLUCOMETER TO TEST ONCE DAILY AND AS NEEDED FOR IRREGULAR BLOOD GLUCOSE      metFORMIN (GLUCOPHAGE) 500 MG tablet Take 1 tablet by mouth 2 times daily (with meals) 60 tablet 2    Dulaglutide 0.75 MG/0.5ML SOPN Inject 0.75 mg into the skin once a week 4 pen 1    dapagliflozin (FARXIGA) 10 MG tablet Take 1 tablet by mouth every morning 30 tablet 5    Lancets Misc. MISC Use with glucometer 100 each 5    blood glucose monitor strips Test 1 time a day prior to breakfast & as needed for symptoms of irregular blood glucose.  100 strip 5    glucose monitoring (FREESTYLE FREEDOM) kit 1 kit by Does not apply route daily Use whichever is covered by insurance 1 kit 0    rosuvastatin (CRESTOR) 10 MG tablet Take 1 tablet by mouth daily 30 tablet 5    ezetimibe (ZETIA) 10 MG tablet Take 1 tablet by mouth daily 90 tablet 3     No current facility-administered medications on file prior to visit. Family History   Problem Relation Age of Onset    Breast Cancer Mother        Social History     Tobacco Use    Smoking status: Never Smoker    Smokeless tobacco: Never Used   Substance Use Topics    Alcohol use: Yes        Lab Results   Component Value Date    WBC 6.4 07/19/2021    HGB 13.5 07/19/2021    HCT 41.2 07/19/2021    MCV 88.6 07/19/2021    PLT 99 (L) 07/19/2021       Chemistry        Component Value Date/Time     (L) 07/14/2021 0830    K 4.8 07/14/2021 0830    CL 96 (L) 07/14/2021 0830    CO2 24 07/14/2021 0830    BUN 13 07/14/2021 0830    CREATININE 0.9 07/14/2021 0830        Component Value Date/Time    CALCIUM 9.5 07/14/2021 0830    ALKPHOS 94 07/14/2021 0830    AST 23 07/14/2021 0830    ALT 19 07/14/2021 0830    BILITOT 0.3 07/14/2021 0830          Lab Results   Component Value Date    ALT 19 07/14/2021    AST 23 07/14/2021    ALKPHOS 94 07/14/2021    BILITOT 0.3 07/14/2021     Lab Results   Component Value Date    LABA1C 11.2 07/14/2021     Lab Results   Component Value Date    .7 07/14/2021       Review of Systems   Constitutional: Negative for activity change, appetite change, fatigue, fever and unexpected weight change. HENT: Negative for congestion, rhinorrhea, sinus pressure and trouble swallowing. Respiratory: Negative for cough, chest tightness, shortness of breath and wheezing. Cardiovascular: Negative for chest pain, palpitations and leg swelling. Gastrointestinal: Negative for abdominal distention, abdominal pain, blood in stool, constipation, diarrhea, nausea and vomiting. Genitourinary: Negative for dysuria, frequency and hematuria. Musculoskeletal: Negative for arthralgias and back pain. Skin: Negative for rash.    Neurological: Negative for dizziness, weakness, light-headedness, numbness and headaches. Wt Readings from Last 3 Encounters:   08/16/21 217 lb (98.4 kg)   07/19/21 225 lb 12.8 oz (102.4 kg)   07/14/21 225 lb 9.6 oz (102.3 kg)       BP Readings from Last 3 Encounters:   08/16/21 116/70   07/19/21 126/82   07/14/21 138/84       Pulse Readings from Last 3 Encounters:   08/16/21 64   07/19/21 79   07/14/21 70       /70   Pulse 64   Temp 97.2 °F (36.2 °C)   Wt 217 lb (98.4 kg)   SpO2 97%   BMI 32.99 kg/m²      Physical Exam  Vitals reviewed. Constitutional:       General: He is awake. He is not in acute distress. Appearance: He is obese. He is not ill-appearing or diaphoretic. HENT:      Head: Normocephalic and atraumatic. No abrasion or masses. Hair is normal.      Right Ear: External ear normal.      Left Ear: External ear normal.      Nose: Nose normal.   Eyes:      General: Lids are normal. Gaze aligned appropriately. No scleral icterus. Right eye: No discharge. Left eye: No discharge. Extraocular Movements: Extraocular movements intact. Conjunctiva/sclera: Conjunctivae normal.   Neck:      Trachea: Phonation normal.   Cardiovascular:      Rate and Rhythm: Normal rate and regular rhythm. Pulmonary:      Effort: Pulmonary effort is normal. No respiratory distress. Breath sounds: No wheezing, rhonchi or rales. Abdominal:      General: Abdomen is flat. There is no distension. Palpations: Abdomen is soft. Musculoskeletal:         General: No deformity. Right shoulder: No swelling, deformity, tenderness, bony tenderness or crepitus. Decreased range of motion. Normal strength. Left shoulder: Normal.      Cervical back: Normal range of motion. No erythema. Right lower leg: No edema. Left lower leg: No edema. Skin:     Coloration: Skin is not cyanotic, jaundiced or pale.       Findings: No abrasion, abscess, bruising, ecchymosis, erythema, signs of injury, laceration, lesion, petechiae, rash or wound. Neurological:      General: No focal deficit present. Mental Status: He is alert. Mental status is at baseline. GCS: GCS eye subscore is 4. GCS verbal subscore is 5. GCS motor subscore is 6. Cranial Nerves: No cranial nerve deficit, dysarthria or facial asymmetry. Motor: No weakness, tremor, atrophy or seizure activity. Coordination: Coordination normal.      Gait: Gait is intact. Psychiatric:         Attention and Perception: Attention and perception normal.         Mood and Affect: Mood and affect normal.         Speech: Speech normal.         Behavior: Behavior normal. Behavior is cooperative. Thought Content: Thought content normal.       Assessment/Plan:   Roseanne Torres was seen today for diabetes and follow-up. Diagnoses and all orders for this visit:    Uncontrolled type 2 diabetes mellitus with hyperglycemia ([address]  Comments:  Advised to keep snack/juice around to minimize chance of hypoglycemia. Discussed that it'll take time to get adjust to lower blood glucose level. Continue regiment. I discussed with patient that having diabetes requires a major lifestyle change. Extensive counseling was given to the patient, who was informed of the microvascular and macrovascular complications of diabetes: increased risk of CAD, MI, CVA, CKD/ESRD (leading to dialysis), diabetic retinopathy, diabetic gastroparesis, diabetic neuropathy, chronic wound infections (eg osteomyelitis), etc.      Patient was informed that diabetics need to be seen every 3 months for routine A1c blood testing and that the goal of A1c is under 7.0 (and under 6.5 for more aggressive treatment) for most patients and under 8.5 for the elderly.       Patient was counseled also to take the medications as prescribed and to check glucose as directed, especially if they're symptomatic (malaise, weak, fatigue, clammy, dizzy, etc.) and to keep food/beverage with him at all times in case his glucose is low. Patient was also advised, especially in the setting of severe neuropathy with numbness to never walk around barefoot. Informed patient of benefits of being on GLP1 & SGLT2 therapy that go beyond glycemic control - low risk of hypoglycemia, weight loss, decreased risk of cardiovascular complications, etc.    Hemoglobin A1C greater than 9%, indicating poor diabetic control    Hypercholesterolemia with LDL greater than 190 mg/dL    Arthritis of right shoulder region  Comments:  Advised to use ice pack along with massaging devices. Orders:  -     diclofenac sodium (VOLTAREN) 1 % GEL; Apply 2 g topically every 6 hours as needed for Pain (back pain)    Class 1 obesity due to excess calories with serious comorbidity and body mass index (BMI) of 34.0 to 34.9 in adult  Comments:  Weight stable. Lifestyle measures discussed. Advised to continue current meds. I reviewed the plan of care with the patient. Patient acknowledged understanding and agreed with plan of care overall. There are no discontinued medications. General information on medications:  -When it comes to medications, whether with starting or adding a new medication or increasing the dose of a current medication, the benefits and risks have to always be considered and weighed over, especially if one is taking other medications as well. -There are no medications that have no side effects and that there is always a risk involved with taking a medication.    -If a side effect were to occur with starting a new medication or with increasing the dose of a current medication that either the medication can be totally discontinued altogether or simply decrease the dose of it and if this would be the case a follow-up appointment would be deemed necessary.    -The drug allergy list will then be updated with the corresponding side effect(s) if it's deemed to be a true 'drug allergy'.     -The most common adverse effects of medication(s) were addressed at today's visit.    -Lastly, the coverage status of a medication may vary from insurance to insurance and the only way to verify if the medication is covered is to send an actual prescription in.    -The drug formulary of each insurance changes without any warning or notification to the healthcare provider let alone the pharmacy.  -The cost of medications vary from insurance to insurance and the cost is always subject to change just like the drug formulary. Level of service (LOS):   -Multiple variables/factors are considered with determining LOS: duration of time spent with patient, time spent reviewing patient's labs, notes (including my own and other specialist's notes if relevant), number of issues addressed during the visit, answering any questions/concerns brought up by the patient, and the overall complexity and decision making regarding the issues addressed during the visit, etc.  -Length of visit is one factor (not the main one) in determining the LOS code selected. -There are different codes to distinguish new patient vs old (established) patient.  -There is a specific LOS/code for a (annual) physical (otherwise known as a wellness visit or biometric screening) can only be used once a year. In addition, the healthcare provider will determine whether the current visit can be considered a 'physical.' Discussing about specific issues/concerns, especially if those issues/concerns are new does not constitute as a 'physical' visit. Follow-up: Return in about 10 weeks (around 10/25/2021) for DM routine 3 mo visit. .     Patient was informed that if his or her symptoms worsen to follow up with me sooner or go to the nearest ER if the symptoms are very significant and warrant higher level of care.     Regarding my note:  -This note was composed (by me only and not with assistance via a scribe) to the best of my knowledge and recollection of the encounter with the patient using one of my own customized note templates utilizing a combination of typing and dictating with the [address] [place] [address] speech recognition software. As a result, the note may possibly contain various errors (e.g. spelling, grammar, and non-sensible words/phrases/statements) despite reviewing the note prior to signing it for completion.    -It is worth noting that most of the time, progress notes are completed usually long after the patient was seen. Every effort is made to have notes as well as other things that needed to be attended to (e.g. medication refills, MyChart messages, documents that require reviewing, etc.) be addressed and completed in a timely fashion (ideally within 72 hours of the patient encounter). -It is also worth noting that healthcare providers often see several patients a day (e.g. > 15-30 patients/day) in either the outpatient and/or inpatient setting(s). In addition, healthcare providers spend a significant amount of time reviewing multiple patients' charts in preparation for their future encounters (pre-charting) as well as time spent reviewing any labs, imaging, specialist's notes (if relevant), and other documents (e.g. recent ER visits or hospital stays or completing forms such as FMLA, short-term/long-term disability), etc.   -Time is also allocated to attending to patient's messages on [employer], phone calls from various people, attending to prescription refills/orders, and also attending meetings or conferences throughout the day. Time and effort is also allocated to coordinating with office staff to make sure various things are completed as part of the day-to-day office management responsibilities.   For the most part, substantial portion of each given day is usually spent with direct patient care followed by documenting.  -Given all this, it is worth pointing out that not every detail of the encounter can be remembered and not everything discussed is considered relevant, significant, or noteworthy. It is also worth mentioning that my recollection of the visit may differ from the respective patient's recollection of the visit. This note is primarily written for myself (the healthcare provider) utilizing one of my own customized templates so I can recall what happened during that visit and may be used for future reference for myself to prepare for follow up appointments. My note is also written in mind for other healthcare professionals (who have their own extensive medical background and experience) for their own understanding (of what happened during the visit) and also more importantly to hopefully help influence and play a role in formulating their plan of care along with their own unique medical expertise. If one has any questions or concerns about my given note, please take into consideration all these aforementioned things before contacting. Parish Garcia M.D.   [address] [place] [address]    Electronically signed by Jessica Rodriguez M.D. on 8/16/2021 at 8:18 AM.

## 2021-08-16 ENCOUNTER — OFFICE VISIT (OUTPATIENT)
Dept: FAMILY MEDICINE CLINIC | Age: 56
End: 2021-08-16
Payer: COMMERCIAL

## 2021-08-16 VITALS
SYSTOLIC BLOOD PRESSURE: 116 MMHG | OXYGEN SATURATION: 97 % | TEMPERATURE: 97.2 F | DIASTOLIC BLOOD PRESSURE: 70 MMHG | BODY MASS INDEX: 32.99 KG/M2 | HEART RATE: 64 BPM | WEIGHT: 217 LBS

## 2021-08-16 DIAGNOSIS — E66.09 CLASS 1 OBESITY DUE TO EXCESS CALORIES WITH SERIOUS COMORBIDITY AND BODY MASS INDEX (BMI) OF 34.0 TO 34.9 IN ADULT: ICD-10-CM

## 2021-08-16 DIAGNOSIS — E78.00 HYPERCHOLESTEROLEMIA WITH LDL GREATER THAN 190 MG/DL: ICD-10-CM

## 2021-08-16 DIAGNOSIS — E11.65 UNCONTROLLED TYPE 2 DIABETES MELLITUS WITH HYPERGLYCEMIA (HCC): Primary | ICD-10-CM

## 2021-08-16 DIAGNOSIS — R73.09 HEMOGLOBIN A1C GREATER THAN 9%, INDICATING POOR DIABETIC CONTROL: ICD-10-CM

## 2021-08-16 DIAGNOSIS — M19.011 ARTHRITIS OF RIGHT SHOULDER REGION: ICD-10-CM

## 2021-08-16 PROCEDURE — 99214 OFFICE O/P EST MOD 30 MIN: CPT | Performed by: FAMILY MEDICINE

## 2021-08-16 RX ORDER — LANCETS 28 GAUGE
EACH MISCELLANEOUS
COMMUNITY
Start: 2021-07-19

## 2021-08-16 ASSESSMENT — ENCOUNTER SYMPTOMS
VOMITING: 0
SHORTNESS OF BREATH: 0
CONSTIPATION: 0
RHINORRHEA: 0
BLOOD IN STOOL: 0
BACK PAIN: 0
WHEEZING: 0
COUGH: 0
NAUSEA: 0
TROUBLE SWALLOWING: 0
DIARRHEA: 0
ABDOMINAL PAIN: 0
ABDOMINAL DISTENTION: 0
CHEST TIGHTNESS: 0
SINUS PRESSURE: 0

## 2021-10-21 NOTE — PROGRESS NOTES
Fide Goodwin   61 y.o. male   1965    HPI:    Patient was last seen by me on 2021. Reason(s) for visit:   Chief Complaint   Patient presents with    Diabetes    3 Month Follow-Up       Type II diabetes mellitus:  Regarding diabetes, patient is on medications as noted below in his medication list. Since the last visit. ..  -Last A1c = 11.2 (2021)  -Glucose readings (on average): fastin-120  -Neuropathy symptoms: no  -Gastroparesis symptoms: no  -Medication adherence: hasn't taken Trulicity in 2 months ago -   -Reported of dizziness with unknown medication. Obesity:  -Lost 16 lbs since LOV (3 mo ago). -Stopped eating a lot of bread.  -Not physically active. Works all the time. Other:  -Patient reported that he has seen a podiatrist some time ago and had hang nail and in grown toe nail removed. Allergies   Allergen Reactions    Atorvastatin      Other reaction(s): Muscle Aches       Current Outpatient Medications on File Prior to Visit   Medication Sig Dispense Refill    metFORMIN (GLUCOPHAGE) 500 MG tablet TAKE 1 TABLET BY MOUTH TWICE DAILY WITH MEALS 60 tablet 0    FreeStyle Lancets MISC USE WITH GLUCOMETER TO TEST ONCE DAILY AND AS NEEDED FOR IRREGULAR BLOOD GLUCOSE      diclofenac sodium (VOLTAREN) 1 % GEL Apply 2 g topically every 6 hours as needed for Pain (back pain) 150 g 2    dapagliflozin (FARXIGA) 10 MG tablet Take 1 tablet by mouth every morning 30 tablet 5    Lancets Misc. MISC Use with glucometer 100 each 5    blood glucose monitor strips Test 1 time a day prior to breakfast & as needed for symptoms of irregular blood glucose.  100 strip 5    glucose monitoring (FREESTYLE FREEDOM) kit 1 kit by Does not apply route daily Use whichever is covered by insurance 1 kit 0    rosuvastatin (CRESTOR) 10 MG tablet Take 1 tablet by mouth daily 30 tablet 5    ezetimibe (ZETIA) 10 MG tablet Take 1 tablet by mouth daily 90 tablet 3     No current facility-administered medications on file prior to visit. Family History   Problem Relation Age of Onset    Breast Cancer Mother        Social History     Tobacco Use    Smoking status: Never Smoker    Smokeless tobacco: Never Used   Substance Use Topics    Alcohol use: Yes        Lab Results   Component Value Date    WBC 6.4 07/19/2021    HGB 13.5 07/19/2021    HCT 41.2 07/19/2021    MCV 88.6 07/19/2021    PLT 99 (L) 07/19/2021       Chemistry        Component Value Date/Time     (L) 07/14/2021 0830    K 4.8 07/14/2021 0830    CL 96 (L) 07/14/2021 0830    CO2 24 07/14/2021 0830    BUN 13 07/14/2021 0830    CREATININE 0.9 07/14/2021 0830        Component Value Date/Time    CALCIUM 9.5 07/14/2021 0830    ALKPHOS 94 07/14/2021 0830    AST 23 07/14/2021 0830    ALT 19 07/14/2021 0830    BILITOT 0.3 07/14/2021 0830          Lab Results   Component Value Date    ALT 19 07/14/2021    AST 23 07/14/2021    ALKPHOS 94 07/14/2021    BILITOT 0.3 07/14/2021     Lab Results   Component Value Date    LABA1C 6.9 10/25/2021     Lab Results   Component Value Date    .7 07/14/2021       Review of Systems   Constitutional: Negative for activity change, appetite change, fatigue, fever and unexpected weight change. HENT: Negative for congestion, rhinorrhea, sinus pressure and trouble swallowing. Respiratory: Negative for cough, chest tightness, shortness of breath and wheezing. Cardiovascular: Negative for chest pain, palpitations and leg swelling. Gastrointestinal: Negative for abdominal distention, abdominal pain, blood in stool, constipation, diarrhea, nausea and vomiting. Genitourinary: Negative for dysuria, frequency and hematuria. Musculoskeletal: Negative for arthralgias and back pain. Skin: Negative for rash. Neurological: Negative for dizziness, weakness, light-headedness, numbness and headaches.        Wt Readings from Last 3 Encounters:   10/25/21 209 lb 12.8 oz (95.2 kg)   08/16/21 217 lb (98.4 kg) 07/19/21 225 lb 12.8 oz (102.4 kg)       BP Readings from Last 3 Encounters:   10/25/21 122/62   08/16/21 116/70   07/19/21 126/82       Pulse Readings from Last 3 Encounters:   10/25/21 66   08/16/21 64   07/19/21 79       /62   Pulse 66   Temp 97.3 °F (36.3 °C)   Wt 209 lb 12.8 oz (95.2 kg)   SpO2 98%   BMI 31.90 kg/m²      Physical Exam  Vitals reviewed. Constitutional:       General: He is awake. He is not in acute distress. Appearance: He is obese. He is not ill-appearing or diaphoretic. HENT:      Head: Normocephalic and atraumatic. No abrasion or masses. Hair is normal.      Right Ear: External ear normal.      Left Ear: External ear normal.      Nose: Nose normal.   Eyes:      General: Lids are normal. Gaze aligned appropriately. No scleral icterus. Right eye: No discharge. Left eye: No discharge. Extraocular Movements: Extraocular movements intact. Conjunctiva/sclera: Conjunctivae normal.   Neck:      Trachea: Phonation normal.   Cardiovascular:      Rate and Rhythm: Normal rate and regular rhythm. Pulmonary:      Effort: Pulmonary effort is normal. No respiratory distress. Breath sounds: No wheezing, rhonchi or rales. Abdominal:      General: Abdomen is flat. There is no distension. Palpations: Abdomen is soft. Musculoskeletal:         General: No deformity. Normal range of motion. Cervical back: Normal range of motion. No erythema. Right lower leg: No edema. Left lower leg: No edema. Right foot: Normal range of motion. No deformity, bunion, Charcot foot, foot drop or prominent metatarsal heads. Left foot: Normal range of motion. No deformity, bunion, Charcot foot, foot drop or prominent metatarsal heads. Feet:      Right foot:      Skin integrity: Skin integrity normal.      Toenail Condition: Fungal disease present.      Left foot:      Skin integrity: Skin integrity normal.      Toenail Condition: Fungal disease present. Skin:     Coloration: Skin is not cyanotic, jaundiced or pale. Findings: No abrasion, abscess, bruising, ecchymosis, erythema, signs of injury, laceration, lesion, petechiae, rash or wound. Neurological:      General: No focal deficit present. Mental Status: He is alert. Mental status is at baseline. GCS: GCS eye subscore is 4. GCS verbal subscore is 5. GCS motor subscore is 6. Cranial Nerves: No cranial nerve deficit, dysarthria or facial asymmetry. Motor: No weakness, tremor, atrophy or seizure activity. Coordination: Coordination normal.      Gait: Gait is intact. Psychiatric:         Attention and Perception: Attention and perception normal.         Mood and Affect: Mood and affect normal.         Speech: Speech normal.         Behavior: Behavior normal. Behavior is cooperative. Thought Content: Thought content normal.       -DM foot exam-  Visual inspection:  -Deformity/amputation: absent  -Skin lesions/pre-ulcerative calluses: absent  -Nails: hypertrophy - none; onychomycosis - multiple toenails  -Edema: right- negative, left- negative    Sensory exam:  -Monofilament sensation: normal  (minimum of 5 random plantar locations tested, avoiding callused areas - > 1 area with absence of sensation is + for neuropathy)    Other:  -Pulses: normal,   -Proprioception: Intact    Assessment/Plan:   Diagnoses and all orders for this visit:    Uncontrolled type 2 diabetes mellitus with hyperglycemia ([facility]  Comments:  Patient declined to continue Trulicity given his P7Y < 7.0 and has been off it for 2 months. Advised to continue rest of other meds, monitor carb/sugar intake, and exercise as much as possible. Orders:  -     POCT glycosylated hemoglobin (Hb A1C)    Onychomycosis of multiple toenails with type 2 diabetes mellitus (HCC)  -     terbinafine (LAMISIL) 250 MG tablet;  Take 1 tablet by mouth daily    Class 1 obesity due to excess calories without serious comorbidity with body mass index (BMI) of 31.0 to 31.9 in adult  Comments:  Patient was advised to monitor caloric/sugar/carb intake, consume small portion sizes, well-balanced meals (moraima with fruits & vegetables), and exercise at least 150 min/week. We discussed also reviewing nutritional labels before purchasing a product and for simplicity sake, purchase the food product that has overall less of everything. It is also recommended to avoid frequent snacking and late night eating. Other: declined flu vaccine    I reviewed the plan of care with the patient. Patient acknowledged understanding and agreed with plan of care overall. Medications Discontinued During This Encounter   Medication Reason    Dulaglutide 0.75 MG/0.5ML SOPN Patient Choice    Dulaglutide 0.75 MG/0.5ML SOPN Patient Choice        General information on medications:  -When it comes to medications, whether with starting or adding a new medication or increasing the dose of a current medication, the benefits and risks have to always be considered and weighed over, especially if one is taking other medications as well. -There are no medications that have no side effects and that there is always a risk involved with taking a medication.    -If a side effect were to occur with starting a new medication or with increasing the dose of a current medication that either the medication can be totally discontinued altogether or simply decrease the dose of it and if this would be the case a follow-up appointment would be deemed necessary.    -The drug allergy list will then be updated with the corresponding side effect(s) if it's deemed to be a true 'drug allergy'.     -The most common adverse effects of medication(s) were addressed at today's visit.    -Lastly, the coverage status of a medication may vary from insurance to insurance and the only way to verify if the medication is covered is to send an actual prescription in.    -The drug formulary of each insurance changes without any warning or notification to the healthcare provider let alone the pharmacy.  -The cost of medications vary from insurance to insurance and the cost is always subject to change just like the drug formulary. Follow-up: Return in about 3 months (around 1/25/2022) for A1c check - diabetes, onychomycosis. .     Patient was informed that if his or her symptoms worsen to follow up with me sooner or go to the nearest ER if the symptoms are very significant and warrant higher level of care. Regarding my note:  -This note was composed (by me only and not with assistance via a scribe) to the best of my knowledge and recollection of the encounter with the patient using one of my own customized note templates utilizing a combination of typing and dictating with the [address] [place] [address] speech recognition software. As a result, the note may possibly contain various errors (e.g. spelling, grammar, and non-sensible words/phrases/statements) despite reviewing the note prior to signing it for completion. Parish Quevedo M.D.   [address] [place] [address]    Electronically signed by Mir Petersen M.D. on 10/25/2021 at 8:27 AM.

## 2021-10-25 ENCOUNTER — OFFICE VISIT (OUTPATIENT)
Dept: FAMILY MEDICINE CLINIC | Age: 56
End: 2021-10-25
Payer: COMMERCIAL

## 2021-10-25 VITALS
WEIGHT: 209.8 LBS | OXYGEN SATURATION: 98 % | SYSTOLIC BLOOD PRESSURE: 122 MMHG | DIASTOLIC BLOOD PRESSURE: 62 MMHG | TEMPERATURE: 97.3 F | BODY MASS INDEX: 31.9 KG/M2 | HEART RATE: 66 BPM

## 2021-10-25 DIAGNOSIS — E11.65 UNCONTROLLED TYPE 2 DIABETES MELLITUS WITH HYPERGLYCEMIA (HCC): Primary | ICD-10-CM

## 2021-10-25 DIAGNOSIS — B35.1 ONYCHOMYCOSIS OF MULTIPLE TOENAILS WITH TYPE 2 DIABETES MELLITUS (HCC): ICD-10-CM

## 2021-10-25 DIAGNOSIS — E11.69 ONYCHOMYCOSIS OF MULTIPLE TOENAILS WITH TYPE 2 DIABETES MELLITUS (HCC): ICD-10-CM

## 2021-10-25 DIAGNOSIS — E66.09 CLASS 1 OBESITY DUE TO EXCESS CALORIES WITHOUT SERIOUS COMORBIDITY WITH BODY MASS INDEX (BMI) OF 31.0 TO 31.9 IN ADULT: ICD-10-CM

## 2021-10-25 LAB — HBA1C MFR BLD: 6.9 %

## 2021-10-25 PROCEDURE — 83036 HEMOGLOBIN GLYCOSYLATED A1C: CPT | Performed by: FAMILY MEDICINE

## 2021-10-25 PROCEDURE — 99213 OFFICE O/P EST LOW 20 MIN: CPT | Performed by: FAMILY MEDICINE

## 2021-10-25 RX ORDER — TERBINAFINE HYDROCHLORIDE 250 MG/1
250 TABLET ORAL DAILY
Qty: 84 TABLET | Refills: 0 | Status: SHIPPED | OUTPATIENT
Start: 2021-10-25 | End: 2022-01-17

## 2021-10-25 ASSESSMENT — ENCOUNTER SYMPTOMS
TROUBLE SWALLOWING: 0
CONSTIPATION: 0
BACK PAIN: 0
CHEST TIGHTNESS: 0
SINUS PRESSURE: 0
SHORTNESS OF BREATH: 0
VOMITING: 0
DIARRHEA: 0
BLOOD IN STOOL: 0
ABDOMINAL DISTENTION: 0
WHEEZING: 0
NAUSEA: 0
ABDOMINAL PAIN: 0
COUGH: 0
RHINORRHEA: 0

## 2021-11-17 DIAGNOSIS — R73.09 HEMOGLOBIN A1C GREATER THAN 9%, INDICATING POOR DIABETIC CONTROL: ICD-10-CM

## 2021-11-17 DIAGNOSIS — E11.65 UNCONTROLLED TYPE 2 DIABETES MELLITUS WITH HYPERGLYCEMIA (HCC): ICD-10-CM

## 2021-12-15 DIAGNOSIS — E11.65 UNCONTROLLED TYPE 2 DIABETES MELLITUS WITH HYPERGLYCEMIA (HCC): ICD-10-CM

## 2021-12-15 DIAGNOSIS — E78.00 HYPERCHOLESTEROLEMIA WITH LDL GREATER THAN 190 MG/DL: ICD-10-CM

## 2021-12-15 DIAGNOSIS — E78.2 MIXED HYPERLIPIDEMIA: ICD-10-CM

## 2021-12-15 DIAGNOSIS — R73.09 HEMOGLOBIN A1C GREATER THAN 9%, INDICATING POOR DIABETIC CONTROL: ICD-10-CM

## 2021-12-15 RX ORDER — ROSUVASTATIN CALCIUM 10 MG/1
10 TABLET, COATED ORAL DAILY
Qty: 30 TABLET | Refills: 1 | Status: SHIPPED | OUTPATIENT
Start: 2021-12-15 | End: 2021-12-15

## 2021-12-15 RX ORDER — ROSUVASTATIN CALCIUM 10 MG/1
10 TABLET, COATED ORAL DAILY
Qty: 90 TABLET | Refills: 1 | Status: SHIPPED | OUTPATIENT
Start: 2021-12-15 | End: 2022-04-25 | Stop reason: SDUPTHER

## 2021-12-15 NOTE — TELEPHONE ENCOUNTER
LOV 10/25/21 NOV 01/24/22     Result Notes     1 HM Topic     Ref Range & Units 7/14/21 0830   Cholesterol, Total 0 - 199 mg/dL 347 High     Triglycerides 0 - 150 mg/dL 197 High     HDL 40 - 60 mg/dL 41    LDL Calculated <100 mg/dL 267 High     VLDL Cholesterol Calculated Not Established mg/dL 39    Resulting Agency  [facility]

## 2022-01-21 NOTE — PROGRESS NOTES
Zuri Arce   61 y.o. male   1965    HPI:    Patient was last seen by me on 10/25/2021. During our last office visit:   -Prescribed Terbinafine for onychomycosis. -Trulicity was discontinued at patient's request.    Reason(s) for visit:   Chief Complaint   Patient presents with    Diabetes    Follow-up    Medication Refill     Type II diabetes mellitus:  Regarding diabetes, patient is on medications as noted below in his medication list. Since the last visit. ..  -Last A1c =    Lab Results   Component Value Date    LABA1C 6.8 2022     -Glucose readings (on average): fastin-116  -Glucose reading: low: 102 max: 120's  -Neuropathy symptoms: none  -Gastroparesis symptoms: none   -Medication adherence: yes      Allergies   Allergen Reactions    Atorvastatin      Other reaction(s): Muscle Aches       Current Outpatient Medications on File Prior to Visit   Medication Sig Dispense Refill    rosuvastatin (CRESTOR) 10 MG tablet TAKE 1 TABLET BY MOUTH DAILY 90 tablet 1    FreeStyle Lancets MISC USE WITH GLUCOMETER TO TEST ONCE DAILY AND AS NEEDED FOR IRREGULAR BLOOD GLUCOSE      diclofenac sodium (VOLTAREN) 1 % GEL Apply 2 g topically every 6 hours as needed for Pain (back pain) 150 g 2    Lancets Misc. MISC Use with glucometer 100 each 5    blood glucose monitor strips Test 1 time a day prior to breakfast & as needed for symptoms of irregular blood glucose. 100 strip 5    glucose monitoring (FREESTYLE FREEDOM) kit 1 kit by Does not apply route daily Use whichever is covered by insurance 1 kit 0    ezetimibe (ZETIA) 10 MG tablet Take 1 tablet by mouth daily 90 tablet 3     No current facility-administered medications on file prior to visit.         Family History   Problem Relation Age of Onset    Breast Cancer Mother        Social History     Tobacco Use    Smoking status: Never Smoker    Smokeless tobacco: Never Used   Substance Use Topics    Alcohol use: Yes        Lab Results   Component Value Date    WBC 6.4 07/19/2021    HGB 13.5 07/19/2021    HCT 41.2 07/19/2021    MCV 88.6 07/19/2021    PLT 99 (L) 07/19/2021       Chemistry        Component Value Date/Time     (L) 07/14/2021 0830    K 4.8 07/14/2021 0830    CL 96 (L) 07/14/2021 0830    CO2 24 07/14/2021 0830    BUN 13 07/14/2021 0830    CREATININE 0.9 07/14/2021 0830        Component Value Date/Time    CALCIUM 9.5 07/14/2021 0830    ALKPHOS 94 07/14/2021 0830    AST 23 07/14/2021 0830    ALT 19 07/14/2021 0830    BILITOT 0.3 07/14/2021 0830          Lab Results   Component Value Date    ALT 19 07/14/2021    AST 23 07/14/2021    ALKPHOS 94 07/14/2021    BILITOT 0.3 07/14/2021     Lab Results   Component Value Date    LABA1C 6.8 01/24/2022     Lab Results   Component Value Date    .7 07/14/2021       Review of Systems   Constitutional: Negative for activity change, appetite change, fatigue, fever and unexpected weight change. HENT: Negative for congestion, rhinorrhea, sinus pressure and trouble swallowing. Respiratory: Negative for cough, chest tightness, shortness of breath and wheezing. Cardiovascular: Negative for chest pain, palpitations and leg swelling. Gastrointestinal: Negative for abdominal distention, abdominal pain, blood in stool, constipation, diarrhea, nausea and vomiting. Genitourinary: Negative for dysuria, frequency and hematuria. Musculoskeletal: Negative for arthralgias and back pain. Skin: Negative for rash. Neurological: Negative for dizziness, weakness, light-headedness, numbness and headaches.        Wt Readings from Last 3 Encounters:   01/24/22 210 lb 6.4 oz (95.4 kg)   10/25/21 209 lb 12.8 oz (95.2 kg)   08/16/21 217 lb (98.4 kg)       BP Readings from Last 3 Encounters:   01/24/22 122/84   10/25/21 122/62   08/16/21 116/70       Pulse Readings from Last 3 Encounters:   01/24/22 72   10/25/21 66   08/16/21 64       /84   Pulse 72   Temp 96.6 °F (35.9 °C)   Ht 5' 8\" (1.727 m)   Wt Attention and perception normal.         Mood and Affect: Mood and affect normal.         Speech: Speech normal.         Behavior: Behavior normal. Behavior is cooperative. Thought Content: Thought content normal.       Assessment/Plan:   Brandon [place] was seen today for diabetes, follow-up and medication refill. Diagnoses and all orders for this visit:    Uncontrolled type 2 [facility] mellitus [facility]  -     POCT glycosylated hemoglobin (Hb A1C)    Hemoglobin A1C greater than 9%, indicating poor diabetic control  Comments:  Patient declined referral to diabetic educator. Will try to avoid insulin if at all possible. Focus on Metformin, GLP-1, and SGLT-2 triple therapy right away. Orders:  -     dapagliflozin (FARXIGA) 10 MG tablet; Take 1 tablet by mouth every morning  -     metFORMIN (GLUCOPHAGE) 500 MG tablet; Take 1 tablet by mouth 2 times daily (with meals)    Onychomycosis of multiple toenails with type 2 diabetes mellitus (HCC)  -     terbinafine (LAMISIL) 250 MG tablet; Take 1 tablet by mouth daily    Educated about 2019 novel coronavirus infection  Comments:  Advised to 3rd dose COVID-19 vaccine. Patient declined. Other: patient declined Hep B vaccine, Pneumovax and COVID-19 vaccine despite educating him on the benefits of getting vaccinated. I reviewed the plan of care with the patient. Patient acknowledged understanding and agreed with plan of care overall. Medications Discontinued During This Encounter   Medication Reason    metFORMIN (GLUCOPHAGE) 500 MG tablet REORDER    dapagliflozin (FARXIGA) 10 MG tablet REORDER    metFORMIN (GLUCOPHAGE) 500 MG tablet         General information on medications:  -When it comes to medications, whether with starting or adding a new medication or increasing the dose of a current medication, the benefits and risks have to always be considered and weighed over, especially if one is taking other medications as well.    -There are no medications that have no side effects and that there is always a risk involved with taking a medication.    -If a side effect were to occur with starting a new medication or with increasing the dose of a current medication that either the medication can be totally discontinued altogether or simply decrease the dose of it and if this would be the case a follow-up appointment would be deemed necessary.    -The drug allergy list will then be updated with the corresponding side effect(s) if it's deemed to be a true 'drug allergy'. -The most common adverse effects of medication(s) were addressed at today's visit.    -Lastly, the coverage status of a medication may vary from insurance to insurance and the only way to verify if the medication is covered is to send an actual prescription in.    -The drug formulary of each insurance changes without any warning or notification to the healthcare provider let alone the pharmacy.  -The cost of medications vary from insurance to insurance and the cost is always subject to change just like the drug formulary. Follow-up: Return in about 3 months (around 4/24/2022) for A1c check - diabetes, annual physical..     Patient was informed that if his or her symptoms worsen to follow up with me sooner or go to the nearest ER if the symptoms are very significant and warrant higher level of care. Regarding my note:  -This note was composed (by me only and not with assistance via a scribe) to the best of my knowledge and recollection of the encounter with the patient using one of my own customized note templates utilizing a combination of typing and dictating with the [address] [place] [address] speech recognition software. As a result, the note may possibly contain various errors (e.g. spelling, grammar, and non-sensible words/phrases/statements) despite reviewing the note prior to signing it for completion. Parish Michel M.D.   [facility] [address] [place] [address]    Electronically signed by Antoinette Clarke M.D. on 1/24/2022 at 8:12 AM.

## 2022-01-24 ENCOUNTER — OFFICE VISIT (OUTPATIENT)
Dept: FAMILY MEDICINE CLINIC | Age: 57
End: 2022-01-24
Payer: COMMERCIAL

## 2022-01-24 VITALS
DIASTOLIC BLOOD PRESSURE: 84 MMHG | BODY MASS INDEX: 31.89 KG/M2 | OXYGEN SATURATION: 99 % | HEART RATE: 72 BPM | TEMPERATURE: 96.6 F | HEIGHT: 68 IN | SYSTOLIC BLOOD PRESSURE: 122 MMHG | WEIGHT: 210.4 LBS

## 2022-01-24 DIAGNOSIS — B35.1 ONYCHOMYCOSIS OF MULTIPLE TOENAILS WITH TYPE 2 DIABETES MELLITUS (HCC): ICD-10-CM

## 2022-01-24 DIAGNOSIS — R73.09 HEMOGLOBIN A1C GREATER THAN 9%, INDICATING POOR DIABETIC CONTROL: ICD-10-CM

## 2022-01-24 DIAGNOSIS — Z71.89 EDUCATED ABOUT 2019 NOVEL CORONAVIRUS INFECTION: ICD-10-CM

## 2022-01-24 DIAGNOSIS — E11.65 UNCONTROLLED TYPE 2 DIABETES MELLITUS WITH HYPERGLYCEMIA (HCC): Primary | ICD-10-CM

## 2022-01-24 DIAGNOSIS — E11.69 ONYCHOMYCOSIS OF MULTIPLE TOENAILS WITH TYPE 2 DIABETES MELLITUS (HCC): ICD-10-CM

## 2022-01-24 LAB — HBA1C MFR BLD: 6.8 %

## 2022-01-24 PROCEDURE — 83036 HEMOGLOBIN GLYCOSYLATED A1C: CPT | Performed by: FAMILY MEDICINE

## 2022-01-24 PROCEDURE — 99213 OFFICE O/P EST LOW 20 MIN: CPT | Performed by: FAMILY MEDICINE

## 2022-01-24 RX ORDER — TERBINAFINE HYDROCHLORIDE 250 MG/1
250 TABLET ORAL DAILY
Qty: 42 TABLET | Refills: 0 | Status: SHIPPED | OUTPATIENT
Start: 2022-01-24 | End: 2022-03-07

## 2022-01-24 ASSESSMENT — ENCOUNTER SYMPTOMS
NAUSEA: 0
DIARRHEA: 0
ABDOMINAL DISTENTION: 0
COUGH: 0
RHINORRHEA: 0
WHEEZING: 0
SHORTNESS OF BREATH: 0
BLOOD IN STOOL: 0
CONSTIPATION: 0
TROUBLE SWALLOWING: 0
BACK PAIN: 0
CHEST TIGHTNESS: 0
VOMITING: 0
ABDOMINAL PAIN: 0
SINUS PRESSURE: 0

## 2022-03-22 DIAGNOSIS — R73.09 HEMOGLOBIN A1C GREATER THAN 9%, INDICATING POOR DIABETIC CONTROL: ICD-10-CM

## 2022-03-22 RX ORDER — DAPAGLIFLOZIN 10 MG/1
TABLET, FILM COATED ORAL
Qty: 90 TABLET | Refills: 0 | Status: SHIPPED | OUTPATIENT
Start: 2022-03-22 | End: 2022-06-13

## 2022-04-22 DIAGNOSIS — R73.09 HEMOGLOBIN A1C GREATER THAN 9%, INDICATING POOR DIABETIC CONTROL: ICD-10-CM

## 2022-04-22 NOTE — PROGRESS NOTES
Penelopericky Head   61 y.o. male   1965    HPI:    Patient was last seen by me on 1/24/2022. During our last office visit:   -Continued current medical regiment. Reason(s) for visit:   Chief Complaint   Patient presents with    Diabetes    Follow-up    Other     Pt is due for colon cancer screening. Type II diabetes mellitus:  Regarding diabetes, patient is on medications as noted below in his medication list. Since the last visit. ..  -Last A1c =    Lab Results   Component Value Date    LABA1C 6.9 04/25/2022    LABA1C 6.8 01/24/2022    LABA1C 6.9 10/25/2021     Lab Results   Component Value Date    LABMICR Not Indicated 07/19/2021    LDLCALC 267 (H) 07/14/2021    CREATININE 0.9 07/14/2021     -Glucose readings (on average): hasn't checked glucose readings  -Nighttime awakenings: none  -Neuropathy symptoms: none  -Gastroparesis symptoms: none   -Medication adherence: compliant    Hyperlipidemia:   -Meds: Rosuvastatin - denied claudication    Lab Results   Component Value Date    CHOL 347 (H) 07/14/2021     Lab Results   Component Value Date    TRIG 197 (H) 07/14/2021     Lab Results   Component Value Date    HDL 41 07/14/2021     Lab Results   Component Value Date    LDLCALC 267 (H) 07/14/2021     Lab Results   Component Value Date    LABVLDL 39 07/14/2021     No results found for: CHOLHDLRATIO      Allergies   Allergen Reactions    Atorvastatin      Other reaction(s): Muscle Aches       Current Outpatient Medications on File Prior to Visit   Medication Sig Dispense Refill    terbinafine (LAMISIL) 250 MG tablet TAKE 1 TABLET BY MOUTH DAILY      FARXIGA 10 MG tablet TAKE 1 TABLET BY MOUTH EVERY MORNING 90 tablet 0    FreeStyle Lancets MISC USE WITH GLUCOMETER TO TEST ONCE DAILY AND AS NEEDED FOR IRREGULAR BLOOD GLUCOSE      diclofenac sodium (VOLTAREN) 1 % GEL Apply 2 g topically every 6 hours as needed for Pain (back pain) 150 g 2    Lancets Misc.  MISC Use with glucometer 100 each 5    blood glucose monitor strips Test 1 time a day prior to breakfast & as needed for symptoms of irregular blood glucose. 100 strip 5    glucose monitoring (FREESTYLE FREEDOM) kit 1 kit by Does not apply route daily Use whichever is covered by insurance 1 kit 0    ezetimibe (ZETIA) 10 MG tablet Take 1 tablet by mouth daily 90 tablet 3     No current facility-administered medications on file prior to visit. Family History   Problem Relation Age of Onset    Breast Cancer Mother        Social History     Tobacco Use    Smoking status: Never Smoker    Smokeless tobacco: Never Used   Substance Use Topics    Alcohol use: Yes        Lab Results   Component Value Date    WBC 6.4 07/19/2021    HGB 13.5 07/19/2021    HCT 41.2 07/19/2021    MCV 88.6 07/19/2021    PLT 99 (L) 07/19/2021         Chemistry        Component Value Date/Time     (L) 07/14/2021 0830    K 4.8 07/14/2021 0830    CL 96 (L) 07/14/2021 0830    CO2 24 07/14/2021 0830    BUN 13 07/14/2021 0830    CREATININE 0.9 07/14/2021 0830        Component Value Date/Time    CALCIUM 9.5 07/14/2021 0830    ALKPHOS 94 07/14/2021 0830    AST 23 07/14/2021 0830    ALT 19 07/14/2021 0830    BILITOT 0.3 07/14/2021 0830          Lab Results   Component Value Date    ALT 19 07/14/2021    AST 23 07/14/2021    ALKPHOS 94 07/14/2021    BILITOT 0.3 07/14/2021       Review of Systems   Constitutional: Negative for activity change, appetite change, fatigue, fever and unexpected weight change. HENT: Negative for congestion, rhinorrhea, sinus pressure and trouble swallowing. Respiratory: Negative for cough, chest tightness, shortness of breath and wheezing. Cardiovascular: Negative for chest pain, palpitations and leg swelling. Gastrointestinal: Negative for abdominal distention, abdominal pain, blood in stool, constipation, diarrhea, nausea and vomiting. Genitourinary: Negative for dysuria, frequency and hematuria.    Musculoskeletal: Negative for arthralgias and back pain. Skin: Negative for rash. Neurological: Negative for dizziness, weakness, light-headedness, numbness and headaches. Psychiatric/Behavioral: Negative for sleep disturbance. The patient is not nervous/anxious. Wt Readings from Last 3 Encounters:   04/25/22 214 lb 9.6 oz (97.3 kg)   01/24/22 210 lb 6.4 oz (95.4 kg)   10/25/21 209 lb 12.8 oz (95.2 kg)       BP Readings from Last 3 Encounters:   04/25/22 118/76   01/24/22 122/84   10/25/21 122/62       Pulse Readings from Last 3 Encounters:   04/25/22 74   01/24/22 72   10/25/21 66       /76   Pulse 74   Temp 97 °F (36.1 °C)   Ht 5' 8\" (1.727 m)   Wt 214 lb 9.6 oz (97.3 kg)   SpO2 98%   BMI 32.63 kg/m²      Physical Exam  Vitals reviewed. Constitutional:       General: He is awake. He is not in acute distress. Appearance: He is obese. He is not ill-appearing or diaphoretic. HENT:      Head: Normocephalic and atraumatic. No abrasion or masses. Hair is normal.      Right Ear: External ear normal.      Left Ear: External ear normal.      Nose: Nose normal.   Eyes:      General: Lids are normal. Gaze aligned appropriately. No scleral icterus. Right eye: No discharge. Left eye: No discharge. Extraocular Movements: Extraocular movements intact. Conjunctiva/sclera: Conjunctivae normal.   Neck:      Trachea: Phonation normal.   Cardiovascular:      Rate and Rhythm: Normal rate and regular rhythm. Pulmonary:      Effort: Pulmonary effort is normal. No respiratory distress. Breath sounds: No wheezing, rhonchi or rales. Abdominal:      General: Abdomen is flat. There is no distension. Palpations: Abdomen is soft. Musculoskeletal:         General: No deformity. Normal range of motion. Cervical back: Normal range of motion. No erythema. Right lower leg: No edema. Left lower leg: No edema. Skin:     Coloration: Skin is not cyanotic, jaundiced or pale.       Findings: No abrasion, abscess, bruising, ecchymosis, erythema, signs of injury, laceration, lesion, petechiae, rash or wound. Neurological:      General: No focal deficit present. Mental Status: He is alert. Mental status is at baseline. GCS: GCS eye subscore is 4. GCS verbal subscore is 5. GCS motor subscore is 6. Cranial Nerves: No cranial nerve deficit, dysarthria or facial asymmetry. Motor: No weakness, tremor, atrophy or seizure activity. Coordination: Coordination normal.      Gait: Gait is intact. Psychiatric:         Attention and Perception: Attention and perception normal.         Mood and Affect: Mood and affect normal.         Speech: Speech normal.         Behavior: Behavior normal. Behavior is cooperative. Thought Content: Thought content normal.       Assessment/Plan:   Martín Ruiz was seen today for diabetes, follow-up and other. Diagnoses and all orders for this visit:    Type 2 diabetes mellitus without complication, without long-term current use of insulin ([facility])  Comments:  Diabetes is controlled. Continue current regiment. Orders:  -     metFORMIN (GLUCOPHAGE) 500 MG tablet; Take 1 tablet by mouth 2 times daily (with meals)  -      DIABETES FOOT EXAM  -     POCT glycosylated hemoglobin (Hb A1C)    Hypercholesterolemia with LDL greater than 190 mg/dL  Comments: Will re-check lipids at [facility] for annual physical.  Orders:  -     rosuvastatin (CRESTOR) 10 MG tablet; Take 1 tablet by mouth daily    Mixed hyperlipidemia  -     rosuvastatin (CRESTOR) 10 MG tablet; Take 1 tablet by mouth daily      I reviewed the plan of care with the patient. Patient acknowledged understanding and agreed with plan of care overall.     Medications Discontinued During This Encounter   Medication Reason    rosuvastatin (CRESTOR) 10 MG tablet REORDER    metFORMIN (GLUCOPHAGE) 500 MG tablet REORDER        General information on medications:  -When it comes to medications, whether with starting or adding a new medication or increasing the dose of a current medication, the benefits and risks have to always be considered and weighed over, especially if one is taking other medications as well. -There are no medications that have no side effects and that there is always a risk involved with taking a medication.    -If a side effect were to occur with starting a new medication or with increasing the dose of a current medication that either the medication can be totally discontinued altogether or simply decrease the dose of it and if this would be the case a follow-up appointment would be deemed necessary.    -The drug allergy list will then be updated with the corresponding side effect(s) if it's deemed to be a true 'drug allergy'. -The most common adverse effects of medication(s) were addressed at today's visit.    -Lastly, the coverage status of a medication may vary from insurance to insurance and the only way to verify if the medication is covered is to send an actual prescription in.    -The drug formulary of each insurance changes without any warning or notification to the healthcare provider let alone the pharmacy.  -The cost of medications vary from insurance to insurance and the cost is always subject to change just like the drug formulary. Follow-up: Return in about 3 months (around 7/25/2022) for annual physical..     Patient was informed that if his or her symptoms worsen to follow up with me sooner or go to the nearest ER if the symptoms are very significant and warrant higher level of care. Regarding my note:  -This note was composed (by me only and not with assistance via a scribe) to the best of my knowledge and recollection of the encounter with the patient using one of my own customized note templates utilizing a combination of typing and dictating with the [address] [place] [address] speech recognition software.   As a result, the note may possibly contain various errors (e.g. spelling, grammar, and non-sensible words/phrases/statements) despite reviewing the note prior to signing it for completion. Time spent includes some or all of the following, both face-to-face time and non face-to-face time, but is not limited to:  [x] Preparing to see the patient by reviewing medical records available (notes, labs, imaging, etc.) prior to seeing the patient. [x] Obtaining and/or reviewing the history from the patient. [x] Performing a medically appropriate examination. [x] Ordering of relevant lab work, medications, referrals, or procedures. [x] Discussing patient's medical issues and formulating an assessment and plan. [x] Reviewing plan of care with patient. Answering any questions or concerns. [x] Documentation within the electronic health record (EHR)  [] Reviewing records of history relevant to patient's issues after seeing the patient. [] Discussion or coordination of care with other health care professionals  [] Other:     Kole Mortensen. Jocelyne Ignacio M.D.   [address] [place] [address]    Electronically signed by Wendy Barros MD M.D. on 4/25/2022 at 8:04 AM.

## 2022-04-25 ENCOUNTER — OFFICE VISIT (OUTPATIENT)
Dept: FAMILY MEDICINE CLINIC | Age: 57
End: 2022-04-25
Payer: COMMERCIAL

## 2022-04-25 VITALS
HEART RATE: 74 BPM | TEMPERATURE: 97 F | SYSTOLIC BLOOD PRESSURE: 118 MMHG | DIASTOLIC BLOOD PRESSURE: 76 MMHG | OXYGEN SATURATION: 98 % | BODY MASS INDEX: 32.52 KG/M2 | HEIGHT: 68 IN | WEIGHT: 214.6 LBS

## 2022-04-25 DIAGNOSIS — E78.00 HYPERCHOLESTEROLEMIA WITH LDL GREATER THAN 190 MG/DL: ICD-10-CM

## 2022-04-25 DIAGNOSIS — E78.2 MIXED HYPERLIPIDEMIA: ICD-10-CM

## 2022-04-25 DIAGNOSIS — E11.9 TYPE 2 DIABETES MELLITUS WITHOUT COMPLICATION, WITHOUT LONG-TERM CURRENT USE OF INSULIN (HCC): Primary | ICD-10-CM

## 2022-04-25 PROBLEM — Z78.9 HEPATITIS B VACCINATION NOT UP TO DATE: Status: ACTIVE | Noted: 2021-07-21

## 2022-04-25 PROBLEM — Z28.39 HEPATITIS B VACCINATION NOT UP TO DATE: Status: ACTIVE | Noted: 2022-04-25

## 2022-04-25 PROBLEM — Z78.9 HEPATITIS B VACCINATION NOT UP TO DATE: Status: ACTIVE | Noted: 2022-04-25

## 2022-04-25 PROBLEM — Z28.39 HEPATITIS B VACCINATION NOT UP TO DATE: Status: ACTIVE | Noted: 2021-07-21

## 2022-04-25 LAB — HBA1C MFR BLD: 6.9 %

## 2022-04-25 PROCEDURE — 3044F HG A1C LEVEL LT 7.0%: CPT | Performed by: FAMILY MEDICINE

## 2022-04-25 PROCEDURE — 83036 HEMOGLOBIN GLYCOSYLATED A1C: CPT | Performed by: FAMILY MEDICINE

## 2022-04-25 PROCEDURE — 99213 OFFICE O/P EST LOW 20 MIN: CPT | Performed by: FAMILY MEDICINE

## 2022-04-25 RX ORDER — ROSUVASTATIN CALCIUM 10 MG/1
10 TABLET, COATED ORAL DAILY
Qty: 90 TABLET | Refills: 1 | Status: SHIPPED | OUTPATIENT
Start: 2022-04-25 | End: 2022-10-24 | Stop reason: SDUPTHER

## 2022-04-25 RX ORDER — TERBINAFINE HYDROCHLORIDE 250 MG/1
TABLET ORAL
COMMUNITY
Start: 2022-03-22 | End: 2022-10-24 | Stop reason: ALTCHOICE

## 2022-04-25 ASSESSMENT — ENCOUNTER SYMPTOMS
SINUS PRESSURE: 0
DIARRHEA: 0
ABDOMINAL DISTENTION: 0
SHORTNESS OF BREATH: 0
NAUSEA: 0
WHEEZING: 0
CONSTIPATION: 0
VOMITING: 0
COUGH: 0
CHEST TIGHTNESS: 0
TROUBLE SWALLOWING: 0
BACK PAIN: 0
RHINORRHEA: 0
ABDOMINAL PAIN: 0
BLOOD IN STOOL: 0

## 2022-06-13 DIAGNOSIS — R73.09 HEMOGLOBIN A1C GREATER THAN 9%, INDICATING POOR DIABETIC CONTROL: ICD-10-CM

## 2022-06-13 RX ORDER — DAPAGLIFLOZIN 10 MG/1
TABLET, FILM COATED ORAL
Qty: 90 TABLET | Refills: 0 | Status: SHIPPED | OUTPATIENT
Start: 2022-06-13 | End: 2022-09-12

## 2022-06-17 DIAGNOSIS — E78.00 HYPERCHOLESTEROLEMIA WITH LDL GREATER THAN 190 MG/DL: ICD-10-CM

## 2022-06-17 DIAGNOSIS — E78.2 MIXED HYPERLIPIDEMIA: ICD-10-CM

## 2022-06-17 RX ORDER — ROSUVASTATIN CALCIUM 10 MG/1
10 TABLET, COATED ORAL DAILY
Qty: 90 TABLET | Refills: 1 | OUTPATIENT
Start: 2022-06-17

## 2022-07-22 NOTE — PROGRESS NOTES
Tessy Sierra   61 y.o. male   1965    HPI:    Patient was last seen by me on 4/25/2022. Reason(s) for visit:   Chief Complaint   Patient presents with    Annual Exam     Health maintenance:    BP:  -BP in office: WNL  -Patient denied issues with frequent headache, chest pain, shortness of breath, palpitations, malaise, etc.    Weight/BMI:  Wt Readings from Last 3 Encounters:   07/25/22 216 lb 3.2 oz (98.1 kg)   04/25/22 214 lb 9.6 oz (97.3 kg)   01/24/22 210 lb 6.4 oz (95.4 kg)     -Physical activity: work    Vision exam:  -Last exam: none done this year  -No visual disturbances reported    Dental exam:  -Last exam: can't recall when.  -No issues reported of dental pain, bleeding gums, temperature sensitivity, etc.     Colon CA screening:  -Last exam: many years ago, but can't recall when.  He can't recall when he was suppose to follow up.  -No issues reported of hematochezia, melena, significant unintentional weight loss, chronic abdominal pain, nausea, vomiting, diarrhea, etc.      Health Maintenance Due   Topic Date Due    Pneumococcal 0-64 years Vaccine (1 - PCV) Never done    HIV screen  Never done    Diabetic retinal exam  Never done    Hepatitis B vaccine (1 of 3 - Risk 3-dose series) Never done    Prostate Specific Antigen (PSA) Screening or Monitoring  Never done    Colorectal Cancer Screen  Never done    Shingles vaccine (1 of 2) Never done    COVID-19 Vaccine (3 - Booster for Pfizer series) 07/04/2021    Lipids  07/14/2022    Diabetic microalbuminuria test  07/19/2022       Immunization History   Administered Date(s) Administered    COVID-19, PFIZER PURPLE top, DILUTE for use, (age 15 y+), 30mcg/0.3mL 01/14/2021, 02/04/2021    Influenza Virus Vaccine 01/02/2018, 11/12/2019    Td, unspecified formulation 10/01/1999    Tdap (Boostrix, Adacel) 01/02/2018       Allergies   Allergen Reactions    Atorvastatin      Other reaction(s): Muscle Aches       Current Outpatient Medications on File Prior to Visit   Medication Sig Dispense Refill    FARXIGA 10 MG tablet TAKE 1 TABLET BY MOUTH EVERY MORNING 90 tablet 0    terbinafine (LAMISIL) 250 MG tablet TAKE 1 TABLET BY MOUTH DAILY      metFORMIN (GLUCOPHAGE) 500 MG tablet Take 1 tablet by mouth 2 times daily (with meals) 180 tablet 1    rosuvastatin (CRESTOR) 10 MG tablet Take 1 tablet by mouth daily 90 tablet 1    FreeStyle Lancets MISC USE WITH GLUCOMETER TO TEST ONCE DAILY AND AS NEEDED FOR IRREGULAR BLOOD GLUCOSE      diclofenac sodium (VOLTAREN) 1 % GEL Apply 2 g topically every 6 hours as needed for Pain (back pain) 150 g 2    Lancets Misc. MISC Use with glucometer 100 each 5    blood glucose monitor strips Test 1 time a day prior to breakfast & as needed for symptoms of irregular blood glucose. 100 strip 5    glucose monitoring (FREESTYLE FREEDOM) kit 1 kit by Does not apply route daily Use whichever is covered by insurance 1 kit 0     No current facility-administered medications on file prior to visit.         Family History   Problem Relation Age of Onset    Breast Cancer Mother        Social History     Tobacco Use    Smoking status: Never    Smokeless tobacco: Never   Substance Use Topics    Alcohol use: Yes        Lab Results   Component Value Date    WBC 6.4 07/19/2021    HGB 13.5 07/19/2021    HCT 41.2 07/19/2021    MCV 88.6 07/19/2021    PLT 99 (L) 07/19/2021       Chemistry        Component Value Date/Time     (L) 07/14/2021 0830    K 4.8 07/14/2021 0830    CL 96 (L) 07/14/2021 0830    CO2 24 07/14/2021 0830    BUN 13 07/14/2021 0830    CREATININE 0.9 07/14/2021 0830        Component Value Date/Time    CALCIUM 9.5 07/14/2021 0830    ALKPHOS 94 07/14/2021 0830    AST 23 07/14/2021 0830    ALT 19 07/14/2021 0830    BILITOT 0.3 07/14/2021 0830          Lab Results   Component Value Date    ALT 19 07/14/2021    AST 23 07/14/2021    ALKPHOS 94 07/14/2021    BILITOT 0.3 07/14/2021     Lab Results   Component Value Date    LABA1C 6.9 04/25/2022     Lab Results   Component Value Date    .7 07/14/2021       Review of Systems   Constitutional:  Negative for activity change, appetite change, fatigue, fever and unexpected weight change. HENT:  Negative for congestion, rhinorrhea, sinus pressure and trouble swallowing. Respiratory:  Negative for cough, chest tightness, shortness of breath and wheezing. Cardiovascular:  Negative for chest pain, palpitations and leg swelling. Gastrointestinal:  Negative for abdominal distention, abdominal pain, blood in stool, constipation, diarrhea, nausea and vomiting. Genitourinary:  Negative for dysuria, frequency and hematuria. Musculoskeletal:  Negative for arthralgias and back pain. Skin:  Negative for rash. Neurological:  Negative for dizziness, weakness, light-headedness, numbness and headaches. Wt Readings from Last 3 Encounters:   07/25/22 216 lb 3.2 oz (98.1 kg)   04/25/22 214 lb 9.6 oz (97.3 kg)   01/24/22 210 lb 6.4 oz (95.4 kg)       BP Readings from Last 3 Encounters:   07/25/22 122/78   04/25/22 118/76   01/24/22 122/84       Pulse Readings from Last 3 Encounters:   07/25/22 67   04/25/22 74   01/24/22 72       /78   Pulse 67   Temp 97.2 °F (36.2 °C)   Wt 216 lb 3.2 oz (98.1 kg)   SpO2 100%   BMI 32.87 kg/m²      Physical Exam  Vitals reviewed. Constitutional:       General: He is awake. He is not in acute distress. Appearance: He is obese. He is not ill-appearing or diaphoretic. HENT:      Head: Normocephalic and atraumatic. No abrasion or masses. Hair is normal.      Right Ear: External ear normal.      Left Ear: External ear normal.      Nose: Nose normal.   Eyes:      General: Lids are normal. Gaze aligned appropriately. No scleral icterus. Right eye: No discharge. Left eye: No discharge. Extraocular Movements: Extraocular movements intact.       Conjunctiva/sclera: Conjunctivae normal.   Neck:      Trachea: Phonation normal.   Cardiovascular: Rate and Rhythm: Normal rate and regular rhythm. Pulmonary:      Effort: Pulmonary effort is normal. No respiratory distress. Breath sounds: No wheezing, rhonchi or rales. Abdominal:      General: Abdomen is flat. There is no distension. Palpations: Abdomen is soft. Musculoskeletal:         General: No deformity. Normal range of motion. Cervical back: Normal range of motion. No erythema. Right lower leg: No edema. Left lower leg: No edema. Skin:     Coloration: Skin is not cyanotic, jaundiced or pale. Findings: No abrasion, abscess, bruising, ecchymosis, erythema, signs of injury, laceration, lesion, petechiae, rash or wound. Neurological:      General: No focal deficit present. Mental Status: He is alert. Mental status is at baseline. GCS: GCS eye subscore is 4. GCS verbal subscore is 5. GCS motor subscore is 6. Cranial Nerves: No cranial nerve deficit, dysarthria or facial asymmetry. Motor: No weakness, tremor, atrophy or seizure activity. Coordination: Coordination normal.      Gait: Gait is intact. Psychiatric:         Attention and Perception: Attention and perception normal.         Mood and Affect: Mood and affect normal.         Speech: Speech normal.         Behavior: Behavior normal. Behavior is cooperative. Thought Content: Thought content normal.      Assessment/Plan:   Marilu Riggs was seen today for annual exam.    Diagnoses and all orders for this visit:    Encounter for preventative adult health care examination  Comments:  Discussed about the vaccinations that he's eligible/due for. He declined getting any of those vaccinations. Orders:  -     CBC with Auto Differential; Future  -     Hemoglobin A1C; Future  -     TSH; Future  -     T4, Free; Future  -     Renal Function Panel; Future  -     Hepatic Function Panel; Future  -     Lipid Panel; Future  -     PSA Screening;  Future  -     AFL - Delvin Stuart MD, Gastroenterology, [facility]    Type 2 diabetes mellitus with obesity ([facility] [address]  Comments:  Continue current regiment for now. Patient vocalized he would like to be seen q6mo instead of q3mo. If his repeat A1c is < 7.0, will consider q6mo. His last A1cs have been controlled. Orders:  -     Hemoglobin A1C; Future  -     Renal Function Panel; Future  -     MICROALBUMIN / CREATININE URINE RATIO; Future  -     AFL - Shira Guan MD, (Comprehensive Ophthalmology, Cataract Surgery) Ophthalmology, [facility]    Hypercholesterolemia with LDL greater than 190 mg/dL  -     ezetimibe (ZETIA) 10 MG tablet; Take 1 tablet by mouth in the morning. Mixed hyperlipidemia  -     ezetimibe (ZETIA) 10 MG tablet; Take 1 tablet by mouth in the morning. Colon cancer screening  -     AFL - Vonnie Spencer MD[facility]      I reviewed the plan of care with the patient. Patient acknowledged understanding and agreed with plan of care overall. Lifestyle measures discussed:  -Patient was advised to monitor caloric/sugar/carb intake, consume small portion sizes, well-balanced meals (moraima with fruits & vegetables), and exercise at least 150 min/week. We discussed also reviewing nutritional labels before purchasing a product and for simplicity sake, purchase the food product that has overall less of everything. It is also recommended to avoid frequent snacking and late night eating. Dental exam: recommended every 6 months as well as directed by dentist    Visual exam: recommended annually    Medications Discontinued During This Encounter   Medication Reason    ezetimibe (Derald Carota) 10 MG tablet REORDER        General information on medications:  -When it comes to medications, whether with starting or adding a new medication or increasing the dose of a current medication, the benefits and risks have to always be considered and weighed over, especially if one is taking other medications as well.    -There are no medications that have no side effects and that there is always a risk involved with taking a medication.    -If a side effect were to occur with starting a new medication or with increasing the dose of a current medication that either the medication can be totally discontinued altogether or simply decrease the dose of it and if this would be the case a follow-up appointment would be deemed necessary.    -The drug allergy list will then be updated with the corresponding side effect(s) if it's deemed to be a true 'drug allergy'. -The most common adverse effects of medication(s) were addressed at today's visit.    -Lastly, the coverage status of a medication may vary from insurance to insurance and the only way to verify if the medication is covered is to send an actual prescription in.    -The drug formulary of each insurance changes without any warning or notification to the healthcare provider let alone the pharmacy.  -The cost of medications vary from insurance to insurance and the cost is always subject to change just like the drug formulary. Follow-up: Return in about 3 months (around 10/25/2022) for A1c check - diabetes. .     Patient was informed that if his or her symptoms worsen to follow up with me sooner or go to the nearest ER if the symptoms are very significant and warrant higher level of care. Regarding my note:  -This note was composed (by me only and not with assistance via a scribe) to the best of my knowledge and recollection of the encounter with the patient using one of my own customized note templates utilizing a combination of typing and dictating with the [address] [place] [address] speech recognition software. As a result, the note may possibly contain various errors (e.g. spelling, grammar, and non-sensible words/phrases/statements) despite reviewing the note prior to signing it for completion.       Time spent includes some or all of the following, both face-to-face time and non face-to-face time, but is not limited to:  [x] Preparing to see the patient by reviewing medical records available (notes, labs, imaging, etc.) prior to seeing the patient. [x] Obtaining and/or reviewing the history from the patient. [x] Performing a medically appropriate examination. [x] Ordering of relevant lab work, medications, referrals, or procedures. [x] Discussing patient's medical issues and formulating an assessment and plan. [x] Reviewing plan of care with patient. Answering any questions or concerns. [x] Documentation within the electronic health record (EHR)  [] Reviewing records of history relevant to patient's issues after seeing the patient. [] Discussion or coordination of care with other health care professionals  [] Other:     Viry Araya. Antelmo Giles M.D.   [address] [place] [address]    Electronically signed by Elisha Garcia MD M.D. on 7/25/2022 at 8:30 AM.

## 2022-07-25 ENCOUNTER — OFFICE VISIT (OUTPATIENT)
Dept: FAMILY MEDICINE CLINIC | Age: 57
End: 2022-07-25
Payer: COMMERCIAL

## 2022-07-25 VITALS
BODY MASS INDEX: 32.87 KG/M2 | HEART RATE: 67 BPM | SYSTOLIC BLOOD PRESSURE: 122 MMHG | TEMPERATURE: 97.2 F | WEIGHT: 216.2 LBS | OXYGEN SATURATION: 100 % | DIASTOLIC BLOOD PRESSURE: 78 MMHG

## 2022-07-25 DIAGNOSIS — E78.2 MIXED HYPERLIPIDEMIA: ICD-10-CM

## 2022-07-25 DIAGNOSIS — E66.9 TYPE 2 DIABETES MELLITUS WITH OBESITY (HCC): ICD-10-CM

## 2022-07-25 DIAGNOSIS — E78.00 HYPERCHOLESTEROLEMIA WITH LDL GREATER THAN 190 MG/DL: ICD-10-CM

## 2022-07-25 DIAGNOSIS — Z00.00 ENCOUNTER FOR PREVENTATIVE ADULT HEALTH CARE EXAMINATION: Primary | ICD-10-CM

## 2022-07-25 DIAGNOSIS — Z12.11 COLON CANCER SCREENING: ICD-10-CM

## 2022-07-25 DIAGNOSIS — E11.69 TYPE 2 DIABETES MELLITUS WITH OBESITY (HCC): ICD-10-CM

## 2022-07-25 PROCEDURE — 99396 PREV VISIT EST AGE 40-64: CPT | Performed by: FAMILY MEDICINE

## 2022-07-25 RX ORDER — EZETIMIBE 10 MG/1
10 TABLET ORAL DAILY
Qty: 90 TABLET | Refills: 3 | Status: SHIPPED | OUTPATIENT
Start: 2022-07-25

## 2022-07-25 ASSESSMENT — ENCOUNTER SYMPTOMS
VOMITING: 0
ABDOMINAL DISTENTION: 0
SHORTNESS OF BREATH: 0
BLOOD IN STOOL: 0
CONSTIPATION: 0
COUGH: 0
SINUS PRESSURE: 0
NAUSEA: 0
RHINORRHEA: 0
BACK PAIN: 0
CHEST TIGHTNESS: 0
ABDOMINAL PAIN: 0
TROUBLE SWALLOWING: 0
WHEEZING: 0
DIARRHEA: 0

## 2022-07-25 ASSESSMENT — PATIENT HEALTH QUESTIONNAIRE - PHQ9
SUM OF ALL RESPONSES TO PHQ QUESTIONS 1-9: 0
SUM OF ALL RESPONSES TO PHQ9 QUESTIONS 1 & 2: 0
2. FEELING DOWN, DEPRESSED OR HOPELESS: 0
SUM OF ALL RESPONSES TO PHQ QUESTIONS 1-9: 0
1. LITTLE INTEREST OR PLEASURE IN DOING THINGS: 0

## 2022-09-12 DIAGNOSIS — R73.09 HEMOGLOBIN A1C GREATER THAN 9%, INDICATING POOR DIABETIC CONTROL: ICD-10-CM

## 2022-09-12 RX ORDER — DAPAGLIFLOZIN 10 MG/1
TABLET, FILM COATED ORAL
Qty: 90 TABLET | Refills: 0 | Status: SHIPPED | OUTPATIENT
Start: 2022-09-12

## 2022-09-14 DIAGNOSIS — E78.00 HYPERCHOLESTEROLEMIA WITH LDL GREATER THAN 190 MG/DL: ICD-10-CM

## 2022-09-14 DIAGNOSIS — E78.2 MIXED HYPERLIPIDEMIA: ICD-10-CM

## 2022-09-14 RX ORDER — EZETIMIBE 10 MG/1
TABLET ORAL
Qty: 90 TABLET | Refills: 3 | OUTPATIENT
Start: 2022-09-14

## 2022-10-21 NOTE — PROGRESS NOTES
Nasrin Heath   61 y.o. male   1965    HPI:    Patient was last seen by me on 2022. Reason(s) for visit:   Chief Complaint   Patient presents with    Diabetes    Follow-up     Type II diabetes mellitus:  -Last A1c =      Lab Results   Component Value Date    LABA1C 6.9 2022    LABA1C 6.8 2022    LABA1C 6.9 10/25/2021       Labs Renal Latest Ref Rng & Units 2021   BUN 7 - 20 mg/dL 13   Cr 0.9 - 1.3 mg/dL 0.9   K 3.5 - 5.1 mmol/L 4.8   Na 136 - 145 mmol/L 133(L)       Lab Results   Component Value Date/Time    LABMICR Not Indicated 2021 07:51 PM    LABMICR 4.10 2021 07:50 PM    LABCREA 174.0 2021 07:50 PM    MALBCR 23.6 2021 07:50 PM       Lab Results   Component Value Date    LDLCALC 267 (H) 2021     -Glucose readings (on average):   [x] Fastin-130  [] Lunchtime:   [] Dinnertime:   [] Hasn't checked glucose readings  -Glucose reading  [] Low:  [] Max:  -Neuropathy symptoms: [] Yes [x] No  -Gastroparesis symptoms: [] Yes [x] No   -Visual disturbances: [] Yes [x] No  -Eye exam: couple of years ago  -Last one -  [] Wears glasses/contact lenses   -Medication adherence: [x] Yes [] No  -Other comments:     Other:  -Patient has not scheduled GI appt for c-scope. -No other issues or complaints. Allergies   Allergen Reactions    Atorvastatin      Other reaction(s): Muscle Aches       Current Outpatient Medications on File Prior to Visit   Medication Sig Dispense Refill    FARXIGA 10 MG tablet TAKE 1 TABLET BY MOUTH EVERY MORNING 90 tablet 0    ezetimibe (ZETIA) 10 MG tablet Take 1 tablet by mouth in the morning. 90 tablet 3    FreeStyle Lancets MISC USE WITH GLUCOMETER TO TEST ONCE DAILY AND AS NEEDED FOR IRREGULAR BLOOD GLUCOSE      diclofenac sodium (VOLTAREN) 1 % GEL Apply 2 g topically every 6 hours as needed for Pain (back pain) 150 g 2    Lancets Misc.  MISC Use with glucometer 100 each 5    blood glucose monitor strips Test 1 time a day prior to breakfast & as needed for symptoms of irregular blood glucose. 100 strip 5    glucose monitoring (FREESTYLE FREEDOM) kit 1 kit by Does not apply route daily Use whichever is covered by insurance 1 kit 0     No current facility-administered medications on file prior to visit. Family History   Problem Relation Age of Onset    Breast Cancer Mother        Social History     Tobacco Use    Smoking status: Never    Smokeless tobacco: Never   Substance Use Topics    Alcohol use: Yes        Lab Results   Component Value Date    WBC 6.4 07/19/2021    HGB 13.5 07/19/2021    HCT 41.2 07/19/2021    MCV 88.6 07/19/2021    PLT 99 (L) 07/19/2021         Chemistry        Component Value Date/Time     (L) 07/14/2021 0830    K 4.8 07/14/2021 0830    CL 96 (L) 07/14/2021 0830    CO2 24 07/14/2021 0830    BUN 13 07/14/2021 0830    CREATININE 0.9 07/14/2021 0830        Component Value Date/Time    CALCIUM 9.5 07/14/2021 0830    ALKPHOS 94 07/14/2021 0830    AST 23 07/14/2021 0830    ALT 19 07/14/2021 0830    BILITOT 0.3 07/14/2021 0830          Lab Results   Component Value Date    ALT 19 07/14/2021    AST 23 07/14/2021    ALKPHOS 94 07/14/2021    BILITOT 0.3 07/14/2021       Lab Results   Component Value Date    CHOL 347 (H) 07/14/2021     Lab Results   Component Value Date    TRIG 197 (H) 07/14/2021     Lab Results   Component Value Date    HDL 41 07/14/2021     Lab Results   Component Value Date    LDLCALC 267 (H) 07/14/2021     Lab Results   Component Value Date    LABVLDL 39 07/14/2021     No results found for: CHOLHDLRATIO      Review of Systems   Constitutional:  Negative for activity change, appetite change, fatigue, fever and unexpected weight change. HENT:  Negative for congestion, rhinorrhea, sinus pressure and trouble swallowing. Respiratory:  Negative for cough, chest tightness, shortness of breath and wheezing. Cardiovascular:  Negative for chest pain, palpitations and leg swelling.    Gastrointestinal: Negative for abdominal distention, abdominal pain, blood in stool, constipation, diarrhea, nausea and vomiting. Genitourinary:  Negative for dysuria, frequency and hematuria. Musculoskeletal:  Negative for arthralgias and back pain. Skin:  Negative for rash. Neurological:  Negative for dizziness, weakness, light-headedness, numbness and headaches. Wt Readings from Last 3 Encounters:   10/24/22 216 lb 9.6 oz (98.2 kg)   07/25/22 216 lb 3.2 oz (98.1 kg)   04/25/22 214 lb 9.6 oz (97.3 kg)       BP Readings from Last 3 Encounters:   10/24/22 130/82   07/25/22 122/78   04/25/22 118/76       Pulse Readings from Last 3 Encounters:   10/24/22 64   07/25/22 67   04/25/22 74       /82   Pulse 64   Temp (!) 96.3 °F (35.7 °C)   Wt 216 lb 9.6 oz (98.2 kg)   SpO2 99%   BMI 32.93 kg/m²      Physical Exam  Vitals reviewed. Constitutional:       General: He is awake. He is not in acute distress. Appearance: He is obese. He is not ill-appearing or diaphoretic. HENT:      Head: Normocephalic and atraumatic. No abrasion or masses. Hair is normal.      Right Ear: External ear normal.      Left Ear: External ear normal.      Nose: Nose normal.   Eyes:      General: Lids are normal. Gaze aligned appropriately. No scleral icterus. Right eye: No discharge. Left eye: No discharge. Extraocular Movements: Extraocular movements intact. Conjunctiva/sclera: Conjunctivae normal.   Neck:      Trachea: Phonation normal.   Cardiovascular:      Rate and Rhythm: Normal rate and regular rhythm. Pulmonary:      Effort: Pulmonary effort is normal. No respiratory distress. Breath sounds: No wheezing, rhonchi or rales. Abdominal:      General: Abdomen is flat. There is no distension. Palpations: Abdomen is soft. Musculoskeletal:         General: No deformity. Normal range of motion. Cervical back: Normal range of motion. No erythema. Right lower leg: No edema.       Left lower leg: No edema. Skin:     Coloration: Skin is not cyanotic, jaundiced or pale. Findings: No abrasion, abscess, bruising, ecchymosis, erythema, signs of injury, laceration, lesion, petechiae, rash or wound. Neurological:      General: No focal deficit present. Mental Status: He is alert. Mental status is at baseline. GCS: GCS eye subscore is 4. GCS verbal subscore is 5. GCS motor subscore is 6. Cranial Nerves: No cranial nerve deficit, dysarthria or facial asymmetry. Motor: No weakness, tremor, atrophy or seizure activity. Coordination: Coordination normal.      Gait: Gait is intact. Psychiatric:         Attention and Perception: Attention and perception normal.         Mood and Affect: Mood and affect normal.         Speech: Speech normal.         Behavior: Behavior normal. Behavior is cooperative. Thought Content: Thought content normal.        Assessment/Plan:   David Busby was seen today for diabetes and follow-up. Diagnoses and all orders for this visit:    Type 2 diabetes mellitus with obesity ([facility] [address]  Comments:  Past A1cs have been controlled and stable. Will continue current regiment for now pending latest A1c. Orders:  -     metFORMIN (GLUCOPHAGE) 500 MG tablet; Take 1 tablet by mouth 2 times daily (with meals)  -     Comprehensive Metabolic Panel; Future  -     Hemoglobin A1C; Future  -     MICROALBUMIN / CREATININE URINE RATIO; Future  -     Hemoglobin A1C  -     Comprehensive Metabolic Panel    Hypercholesterolemia with LDL greater than 190 mg/dL  Comments: [facility].  Orders:  -     rosuvastatin (CRESTOR) 10 MG tablet; Take 1 tablet by mouth daily  -     Lipid Panel; Future  -     Lipid Panel    Colon cancer screening  -     Fecal DNA Colorectal cancer screening (Cologuard)    Other: patient declined flu and pneumovax vaccine. I reviewed the plan of care with the patient.  Patient acknowledged understanding and agreed with plan of care overall. Medications Discontinued During This Encounter   Medication Reason    terbinafine (LAMISIL) 250 MG tablet Therapy completed    metFORMIN (GLUCOPHAGE) 500 MG tablet REORDER    rosuvastatin (CRESTOR) 10 MG tablet REORDER        General information on medications:  -When it comes to medications, whether with starting or adding a new medication or increasing the dose of a current medication, the benefits and risks have to always be considered and weighed over, especially if one is taking other medications as well. -There are no medications that have no side effects and that there is always a risk involved with taking a medication.    -If a side effect were to occur with starting a new medication or with increasing the dose of a current medication that either the medication can be totally discontinued altogether or simply decrease the dose of it and if this would be the case a follow-up appointment would be deemed necessary.    -The drug allergy list will then be updated with the corresponding side effect(s) if it's deemed to be a true 'drug allergy'. -The most common adverse effects of medication(s) were addressed at today's visit.    -Lastly, the coverage status of a medication may vary from insurance to insurance and the only way to verify if the medication is covered is to send an actual prescription in.    -The drug formulary of each insurance changes without any warning or notification to the healthcare provider let alone the pharmacy.  -The cost of medications vary from insurance to insurance and the cost is always subject to change just like the drug formulary. Follow-up: No follow-ups on file. .     Patient was informed that if his or her symptoms worsen to follow up with me sooner or go to the nearest ER if the symptoms are very significant and warrant higher level of care.     Regarding my note:  -This note was composed (by me only and not with assistance via a scribe) to the best of my knowledge and recollection of the encounter with the patient using one of my own customized note templates utilizing a combination of typing and dictating with the [address] [place] [address] speech recognition software. As a result, the note may possibly contain various errors (e.g. spelling, grammar, and non-sensible words/phrases/statements) despite reviewing the note prior to signing it for completion. Time spent includes some or all of the following, both face-to-face time and non face-to-face time, but is not limited to:  [x] Preparing to see the patient by reviewing medical records available (notes, labs, imaging, etc.) prior to seeing the patient. [x] Obtaining and/or reviewing the history from the patient. [x] Performing a medically appropriate examination. [x] Ordering of relevant lab work, medications, referrals, or procedures. [x] Discussing patient's medical issues and formulating an assessment and plan. [x] Reviewing plan of care with patient. Answering any questions or concerns. [x] Documentation within the electronic health record (EHR)  [] Reviewing records of history relevant to patient's issues after seeing the patient. [] Discussion or coordination of care with other health care professionals  [x] Other: length office visit - 15 minutes.  -I spent a significant amount of time discussing various issues as noted above and also with formulating a treatment plan for each specific issue. Patient was given the opportunity to ask me any questions and address any concerns/issues. I also reviewed lab work (if available) as well as prior notes from PCP and/or other specialists if available. Parish Mccann M.D.   [address] [place] [address]    Electronically signed by Ramila Iglesias MD M.D. on 10/24/2022 at 8:06 AM.

## 2022-10-24 ENCOUNTER — OFFICE VISIT (OUTPATIENT)
Dept: FAMILY MEDICINE CLINIC | Age: 57
End: 2022-10-24
Payer: COMMERCIAL

## 2022-10-24 VITALS
DIASTOLIC BLOOD PRESSURE: 82 MMHG | HEART RATE: 64 BPM | BODY MASS INDEX: 32.93 KG/M2 | WEIGHT: 216.6 LBS | TEMPERATURE: 96.3 F | SYSTOLIC BLOOD PRESSURE: 130 MMHG | OXYGEN SATURATION: 99 %

## 2022-10-24 DIAGNOSIS — E11.69 TYPE 2 DIABETES MELLITUS WITH OBESITY (HCC): ICD-10-CM

## 2022-10-24 DIAGNOSIS — E66.9 TYPE 2 DIABETES MELLITUS WITH OBESITY (HCC): Primary | ICD-10-CM

## 2022-10-24 DIAGNOSIS — E66.9 TYPE 2 DIABETES MELLITUS WITH OBESITY (HCC): ICD-10-CM

## 2022-10-24 DIAGNOSIS — E11.69 TYPE 2 DIABETES MELLITUS WITH OBESITY (HCC): Primary | ICD-10-CM

## 2022-10-24 DIAGNOSIS — E78.00 HYPERCHOLESTEROLEMIA WITH LDL GREATER THAN 190 MG/DL: ICD-10-CM

## 2022-10-24 DIAGNOSIS — Z12.11 COLON CANCER SCREENING: ICD-10-CM

## 2022-10-24 PROBLEM — E11.9 TYPE 2 DIABETES MELLITUS WITHOUT COMPLICATION, WITHOUT LONG-TERM CURRENT USE OF INSULIN (HCC): Status: ACTIVE | Noted: 2022-10-24

## 2022-10-24 LAB
CREATININE URINE: 172 MG/DL (ref 39–259)
MICROALBUMIN UR-MCNC: 2.9 MG/DL
MICROALBUMIN/CREAT UR-RTO: 16.9 MG/G (ref 0–30)

## 2022-10-24 PROCEDURE — 3044F HG A1C LEVEL LT 7.0%: CPT | Performed by: FAMILY MEDICINE

## 2022-10-24 PROCEDURE — 99213 OFFICE O/P EST LOW 20 MIN: CPT | Performed by: FAMILY MEDICINE

## 2022-10-24 RX ORDER — ROSUVASTATIN CALCIUM 10 MG/1
10 TABLET, COATED ORAL DAILY
Qty: 90 TABLET | Refills: 1 | Status: SHIPPED | OUTPATIENT
Start: 2022-10-24

## 2022-10-24 ASSESSMENT — ENCOUNTER SYMPTOMS
CHEST TIGHTNESS: 0
NAUSEA: 0
TROUBLE SWALLOWING: 0
BACK PAIN: 0
SINUS PRESSURE: 0
ABDOMINAL PAIN: 0
SHORTNESS OF BREATH: 0
BLOOD IN STOOL: 0
CONSTIPATION: 0
COUGH: 0
VOMITING: 0
DIARRHEA: 0
RHINORRHEA: 0
WHEEZING: 0
ABDOMINAL DISTENTION: 0

## 2022-10-25 LAB
A/G RATIO: 2 (ref 1.1–2.2)
ALBUMIN SERPL-MCNC: 4.5 G/DL (ref 3.4–5)
ALP BLD-CCNC: 77 U/L (ref 40–129)
ALT SERPL-CCNC: 17 U/L (ref 10–40)
ANION GAP SERPL CALCULATED.3IONS-SCNC: 14 MMOL/L (ref 3–16)
AST SERPL-CCNC: 17 U/L (ref 15–37)
BILIRUB SERPL-MCNC: 0.6 MG/DL (ref 0–1)
BUN BLDV-MCNC: 15 MG/DL (ref 7–20)
CALCIUM SERPL-MCNC: 9.4 MG/DL (ref 8.3–10.6)
CHLORIDE BLD-SCNC: 101 MMOL/L (ref 99–110)
CHOLESTEROL, TOTAL: 155 MG/DL (ref 0–199)
CO2: 23 MMOL/L (ref 21–32)
CREAT SERPL-MCNC: 1 MG/DL (ref 0.9–1.3)
ESTIMATED AVERAGE GLUCOSE: 159.9 MG/DL
GFR SERPL CREATININE-BSD FRML MDRD: >60 ML/MIN/{1.73_M2}
GLUCOSE BLD-MCNC: 111 MG/DL (ref 70–99)
HBA1C MFR BLD: 7.2 %
HDLC SERPL-MCNC: 44 MG/DL (ref 40–60)
LDL CHOLESTEROL CALCULATED: 91 MG/DL
POTASSIUM SERPL-SCNC: 4.5 MMOL/L (ref 3.5–5.1)
SODIUM BLD-SCNC: 138 MMOL/L (ref 136–145)
TOTAL PROTEIN: 6.8 G/DL (ref 6.4–8.2)
TRIGL SERPL-MCNC: 101 MG/DL (ref 0–150)
VLDLC SERPL CALC-MCNC: 20 MG/DL

## 2022-11-12 LAB — NONINV COLON CA DNA+OCC BLD SCRN STL QL: NEGATIVE

## 2022-12-16 DIAGNOSIS — R73.09 HEMOGLOBIN A1C GREATER THAN 9%, INDICATING POOR DIABETIC CONTROL: ICD-10-CM

## 2022-12-16 RX ORDER — DAPAGLIFLOZIN 10 MG/1
TABLET, FILM COATED ORAL
Qty: 90 TABLET | Refills: 0 | Status: SHIPPED | OUTPATIENT
Start: 2022-12-16

## 2022-12-21 DIAGNOSIS — E78.00 HYPERCHOLESTEROLEMIA WITH LDL GREATER THAN 190 MG/DL: ICD-10-CM

## 2022-12-21 DIAGNOSIS — E66.9 TYPE 2 DIABETES MELLITUS WITH OBESITY (HCC): ICD-10-CM

## 2022-12-21 DIAGNOSIS — E11.69 TYPE 2 DIABETES MELLITUS WITH OBESITY (HCC): ICD-10-CM

## 2022-12-21 RX ORDER — ROSUVASTATIN CALCIUM 10 MG/1
10 TABLET, COATED ORAL DAILY
Qty: 90 TABLET | Refills: 1 | OUTPATIENT
Start: 2022-12-21

## 2023-01-15 NOTE — PROGRESS NOTES
Olivia Ron   61 y.o. male   1965    HPI:    Patient was last seen by me on 10/24/2022. During our last office visit:   -Continued current medical regiment. Reason(s) for visit:   Chief Complaint   Patient presents with    Diabetes    Follow-up     Type II diabetes mellitus:  -Last A1c =    Lab Results   Component Value Date    LABA1C 7.2 10/24/2022    LABA1C 6.9 04/25/2022    LABA1C 6.8 01/24/2022       Labs Renal Latest Ref Rng & Units 10/24/2022 7/14/2021   BUN 7 - 20 mg/dL 15 13   Cr 0.9 - 1.3 mg/dL 1.0 0.9   K 3.5 - 5.1 mmol/L 4.5 4.8   Na 136 - 145 mmol/L 138 133(L)       Lab Results   Component Value Date/Time    LABMICR 2.90 10/24/2022 08:16 AM    LABMICR Not Indicated 07/19/2021 07:51 PM    LABMICR 4.10 07/19/2021 07:50 PM    LABCREA 172.0 10/24/2022 08:16 AM    LABCREA 174.0 07/19/2021 07:50 PM    MALBCR 16.9 10/24/2022 08:16 AM    MALBCR 23.6 07/19/2021 07:50 PM       Lab Results   Component Value Date    LDLCALC 91 10/24/2022     -Glucose readings (on average):   [x] Fasting: (sparingly) - 120-140  [] Lunchtime:   [] Dinnertime:   [] Hasn't checked glucose readings  -Glucose reading  [] Low:  [] Max:  -Neuropathy symptoms: [] Yes [x] No  -Gastroparesis symptoms: [] Yes [x] No   -Visual disturbances: [] Yes [x] No  -Eye exam:   -Last one -  [] Wears glasses/contact lenses   -Medication adherence: [x] Yes [] No  -Other comments: patient takes two tablets Metformin 500 mg and one tablet in the evening - first him telling me this. Other: patient informed me that he takes call with his work -     Allergies   Allergen Reactions    Atorvastatin      Other reaction(s): Muscle Aches       Current Outpatient Medications on File Prior to Visit   Medication Sig Dispense Refill    FARXIGA 10 MG tablet TAKE 1 TABLET BY MOUTH EVERY MORNING 90 tablet 0    rosuvastatin (CRESTOR) 10 MG tablet Take 1 tablet by mouth daily 90 tablet 1    ezetimibe (ZETIA) 10 MG tablet Take 1 tablet by mouth in the morning. 90 tablet 3    FreeStyle Lancets MISC USE WITH GLUCOMETER TO TEST ONCE DAILY AND AS NEEDED FOR IRREGULAR BLOOD GLUCOSE      diclofenac sodium (VOLTAREN) 1 % GEL Apply 2 g topically every 6 hours as needed for Pain (back pain) 150 g 2    Lancets Misc. MISC Use with glucometer 100 each 5    blood glucose monitor strips Test 1 time a day prior to breakfast & as needed for symptoms of irregular blood glucose. 100 strip 5    glucose monitoring (FREESTYLE FREEDOM) kit 1 kit by Does not apply route daily Use whichever is covered by insurance 1 kit 0     No current facility-administered medications on file prior to visit.         Family History   Problem Relation Age of Onset    Breast Cancer Mother        Social History     Tobacco Use    Smoking status: Never    Smokeless tobacco: Never   Substance Use Topics    Alcohol use: Yes        Lab Results   Component Value Date    WBC 6.4 07/19/2021    HGB 13.5 07/19/2021    HCT 41.2 07/19/2021    MCV 88.6 07/19/2021    PLT 99 (L) 07/19/2021         Chemistry        Component Value Date/Time     10/24/2022 2201    K 4.5 10/24/2022 2201     10/24/2022 2201    CO2 23 10/24/2022 2201    BUN 15 10/24/2022 2201    CREATININE 1.0 10/24/2022 2201        Component Value Date/Time    CALCIUM 9.4 10/24/2022 2201    ALKPHOS 77 10/24/2022 2201    AST 17 10/24/2022 2201    ALT 17 10/24/2022 2201    BILITOT 0.6 10/24/2022 2201          Lab Results   Component Value Date    ALT 17 10/24/2022    AST 17 10/24/2022    ALKPHOS 77 10/24/2022    BILITOT 0.6 10/24/2022       Lab Results   Component Value Date    CHOL 155 10/24/2022    CHOL 347 (H) 07/14/2021     Lab Results   Component Value Date    TRIG 101 10/24/2022    TRIG 197 (H) 07/14/2021     Lab Results   Component Value Date    HDL 44 10/24/2022    HDL 41 07/14/2021     Lab Results   Component Value Date    LDLCALC 91 10/24/2022    LDLCALC 267 (H) 07/14/2021     Lab Results   Component Value Date    LABVLDL 20 10/24/2022    LABVLDL 39 07/14/2021     No results found for: CHOLHDLRATIO      Review of Systems   Constitutional:  Negative for activity change, appetite change, fatigue, fever and unexpected weight change.   HENT:  Negative for congestion, rhinorrhea, sinus pressure and trouble swallowing.    Respiratory:  Negative for cough, chest tightness, shortness of breath and wheezing.    Cardiovascular:  Negative for chest pain, palpitations and leg swelling.   Gastrointestinal:  Negative for abdominal distention, abdominal pain, blood in stool, constipation, diarrhea, nausea and vomiting.   Genitourinary:  Negative for dysuria, frequency and hematuria.   Musculoskeletal:  Negative for arthralgias and back pain.   Skin:  Negative for rash.   Neurological:  Negative for dizziness, weakness, light-headedness, numbness and headaches.   Psychiatric/Behavioral:  Negative for agitation, decreased concentration, dysphoric mood, sleep disturbance and suicidal ideas. The patient is not nervous/anxious.         Wt Readings from Last 3 Encounters:   01/16/23 216 lb 12.8 oz (98.3 kg)   10/24/22 216 lb 9.6 oz (98.2 kg)   07/25/22 216 lb 3.2 oz (98.1 kg)       BP Readings from Last 3 Encounters:   01/16/23 120/78   10/24/22 130/82   07/25/22 122/78       Pulse Readings from Last 3 Encounters:   01/16/23 89   10/24/22 64   07/25/22 67       /78   Pulse 89   Temp 98.2 °F (36.8 °C)   Wt 216 lb 12.8 oz (98.3 kg)   SpO2 97%   BMI 32.96 kg/m²      Physical Exam  Vitals reviewed.   Constitutional:       General: He is awake. He is not in acute distress.     Appearance: He is not ill-appearing or diaphoretic.   HENT:      Head: Normocephalic and atraumatic. No abrasion or masses. Hair is normal.      Right Ear: External ear normal.      Left Ear: External ear normal.      Nose: Nose normal.   Eyes:      General: Lids are normal. Gaze aligned appropriately. No scleral icterus.        Right eye: No discharge.         Left eye: No discharge.       Extraocular Movements: Extraocular movements intact. Conjunctiva/sclera: Conjunctivae normal.   Neck:      Trachea: Phonation normal.   Cardiovascular:      Rate and Rhythm: Normal rate and regular rhythm. Pulmonary:      Effort: Pulmonary effort is normal. No respiratory distress. Breath sounds: No wheezing, rhonchi or rales. Abdominal:      General: Abdomen is flat. There is no distension. Palpations: Abdomen is soft. Musculoskeletal:         General: No deformity. Normal range of motion. Cervical back: Normal range of motion. No erythema. Right lower leg: No edema. Left lower leg: No edema. Skin:     Coloration: Skin is not cyanotic, jaundiced or pale. Findings: No abrasion, abscess, bruising, ecchymosis, erythema, signs of injury, laceration, lesion, petechiae, rash or wound. Neurological:      General: No focal deficit present. Mental Status: He is alert. Mental status is at baseline. GCS: GCS eye subscore is 4. GCS verbal subscore is 5. GCS motor subscore is 6. Cranial Nerves: No cranial nerve deficit, dysarthria or facial asymmetry. Motor: No weakness, tremor, atrophy or seizure activity. Coordination: Coordination normal.      Gait: Gait is intact. Psychiatric:         Attention and Perception: Attention and perception normal.         Mood and Affect: Mood and affect normal.         Speech: Speech normal.         Behavior: Behavior normal. Behavior is cooperative. Thought Content: Thought content normal.        Assessment/Plan:   Win Self was seen today for diabetes and follow-up. Diagnoses and all orders for this visit:    Type 2 diabetes mellitus with obesity ([facility] [place] [address]  Comments:  Past A1cs have been controlled and stable. Will continue current regiment for now pending latest A1c. Orders:  -     Comprehensive Metabolic Panel; Future  -     Hemoglobin A1C; Future  -     metFORMIN (GLUCOPHAGE) 1000 MG tablet;  Take 1 tablet by mouth 2 times daily (with meals)  -     AFL - Anoop Ferris MD, (Glaucoma, Cataract Surgery) Ophthalmology, [facility]    I reviewed the plan of care with the patient. Patient acknowledged understanding and agreed with plan of care overall. Medications Discontinued During This Encounter   Medication Reason    metFORMIN (GLUCOPHAGE) 500 MG tablet DOSE ADJUSTMENT        General information on medications:  -When it comes to medications, whether with starting or adding a new medication or increasing the dose of a current medication, the benefits and risks have to always be considered and weighed over, especially if one is taking other medications as well. -There are no medications that have no side effects and that there is always a risk involved with taking a medication.    -If a side effect were to occur with starting a new medication or with increasing the dose of a current medication that either the medication can be totally discontinued altogether or simply decrease the dose of it and if this would be the case a follow-up appointment would be deemed necessary.    -The drug allergy list will then be updated with the corresponding side effect(s) if it's deemed to be a true 'drug allergy'. -The most common adverse effects of medication(s) were addressed at today's visit.    -Lastly, the coverage status of a medication may vary from insurance to insurance and the only way to verify if the medication is covered is to send an actual prescription in.    -The drug formulary of each insurance changes without any warning or notification to the healthcare provider let alone the pharmacy.  -The cost of medications vary from insurance to insurance and the cost is always subject to change just like the drug formulary.     Follow-up: Return in about 4 months (around 5/16/2023) for annual physical..     Patient was informed that if his or her symptoms worsen to follow up with me sooner or go to the nearest ER if the symptoms are very significant and warrant higher level of care. Regarding my note:  -This note was composed (by me only and not with assistance via a scribe) to the best of my knowledge and recollection of the encounter with the patient using one of my own customized note templates utilizing a combination of typing and dictating with the [address] [place] [address] speech recognition software. As a result, the note may possibly contain various errors (e.g. spelling, grammar, and non-sensible words/phrases/statements) despite reviewing the note prior to signing it for completion. Time spent includes some or all of the following, both face-to-face time and non face-to-face time, but is not limited to:  [x] Preparing to see the patient by reviewing medical records available (notes, labs, imaging, etc.) prior to seeing the patient. [x] Obtaining and/or reviewing the history from the patient. [x] Performing a medically appropriate examination. [x] Ordering of relevant lab work, medications, referrals, or procedures. [x] Discussing patient's medical issues and formulating an assessment and plan. [x] Reviewing plan of care with patient. Answering any questions or concerns. [x] Documentation within the electronic health record (EHR)  [] Reviewing records of history relevant to patient's issues after seeing the patient. [] Discussion or coordination of care with other health care professionals  [x] Other: length office visit - 20 minutes.  -I spent a significant amount of time discussing various issues as noted above and also with formulating a treatment plan for each specific issue. Patient was given the opportunity to ask me any questions and address any concerns/issues. I also reviewed lab work (if available) as well as prior notes from PCP and/or other specialists if available. Parish Velasquez M.D.   [address] [place] [address] Adilene Newman    Electronically signed by Flash Flynn MD M.D. on 1/16/2023 at 8:14 AM.

## 2023-01-16 ENCOUNTER — OFFICE VISIT (OUTPATIENT)
Dept: FAMILY MEDICINE CLINIC | Age: 58
End: 2023-01-16
Payer: COMMERCIAL

## 2023-01-16 VITALS
HEART RATE: 89 BPM | SYSTOLIC BLOOD PRESSURE: 120 MMHG | WEIGHT: 216.8 LBS | BODY MASS INDEX: 32.96 KG/M2 | DIASTOLIC BLOOD PRESSURE: 78 MMHG | OXYGEN SATURATION: 97 % | TEMPERATURE: 98.2 F

## 2023-01-16 DIAGNOSIS — E66.9 TYPE 2 DIABETES MELLITUS WITH OBESITY (HCC): Primary | ICD-10-CM

## 2023-01-16 DIAGNOSIS — E11.69 TYPE 2 DIABETES MELLITUS WITH OBESITY (HCC): Primary | ICD-10-CM

## 2023-01-16 PROCEDURE — 99213 OFFICE O/P EST LOW 20 MIN: CPT | Performed by: FAMILY MEDICINE

## 2023-01-16 ASSESSMENT — ENCOUNTER SYMPTOMS
BACK PAIN: 0
VOMITING: 0
SINUS PRESSURE: 0
NAUSEA: 0
SHORTNESS OF BREATH: 0
BLOOD IN STOOL: 0
RHINORRHEA: 0
CONSTIPATION: 0
ABDOMINAL PAIN: 0
TROUBLE SWALLOWING: 0
WHEEZING: 0
DIARRHEA: 0
COUGH: 0
ABDOMINAL DISTENTION: 0
CHEST TIGHTNESS: 0

## 2023-01-16 ASSESSMENT — PATIENT HEALTH QUESTIONNAIRE - PHQ9
SUM OF ALL RESPONSES TO PHQ QUESTIONS 1-9: 0
2. FEELING DOWN, DEPRESSED OR HOPELESS: 0
SUM OF ALL RESPONSES TO PHQ9 QUESTIONS 1 & 2: 0
SUM OF ALL RESPONSES TO PHQ QUESTIONS 1-9: 0
1. LITTLE INTEREST OR PLEASURE IN DOING THINGS: 0
SUM OF ALL RESPONSES TO PHQ QUESTIONS 1-9: 0
SUM OF ALL RESPONSES TO PHQ QUESTIONS 1-9: 0

## 2023-03-18 DIAGNOSIS — R73.09 HEMOGLOBIN A1C GREATER THAN 9%, INDICATING POOR DIABETIC CONTROL: ICD-10-CM

## 2023-03-20 RX ORDER — DAPAGLIFLOZIN 10 MG/1
TABLET, FILM COATED ORAL
Qty: 90 TABLET | Refills: 0 | Status: SHIPPED | OUTPATIENT
Start: 2023-03-20

## 2023-05-22 ENCOUNTER — OFFICE VISIT (OUTPATIENT)
Dept: FAMILY MEDICINE CLINIC | Age: 58
End: 2023-05-22
Payer: COMMERCIAL

## 2023-05-22 VITALS
DIASTOLIC BLOOD PRESSURE: 78 MMHG | BODY MASS INDEX: 32.17 KG/M2 | HEART RATE: 88 BPM | SYSTOLIC BLOOD PRESSURE: 102 MMHG | WEIGHT: 211.6 LBS | OXYGEN SATURATION: 97 %

## 2023-05-22 DIAGNOSIS — E11.69 TYPE 2 DIABETES MELLITUS WITH OBESITY (HCC): ICD-10-CM

## 2023-05-22 DIAGNOSIS — E11.69 TYPE 2 DIABETES MELLITUS WITH HYPERLIPIDEMIA (HCC): ICD-10-CM

## 2023-05-22 DIAGNOSIS — Z13.228 SCREENING FOR ENDOCRINE, NUTRITIONAL, METABOLIC AND IMMUNITY DISORDER: ICD-10-CM

## 2023-05-22 DIAGNOSIS — E66.9 TYPE 2 DIABETES MELLITUS WITH OBESITY (HCC): ICD-10-CM

## 2023-05-22 DIAGNOSIS — Z13.21 SCREENING FOR ENDOCRINE, NUTRITIONAL, METABOLIC AND IMMUNITY DISORDER: ICD-10-CM

## 2023-05-22 DIAGNOSIS — Z13.29 SCREENING FOR ENDOCRINE, NUTRITIONAL, METABOLIC AND IMMUNITY DISORDER: ICD-10-CM

## 2023-05-22 DIAGNOSIS — Z13.0 SCREENING FOR ENDOCRINE, NUTRITIONAL, METABOLIC AND IMMUNITY DISORDER: ICD-10-CM

## 2023-05-22 DIAGNOSIS — Z00.00 ENCOUNTER FOR PREVENTATIVE ADULT HEALTH CARE EXAMINATION: Primary | ICD-10-CM

## 2023-05-22 DIAGNOSIS — E78.5 TYPE 2 DIABETES MELLITUS WITH HYPERLIPIDEMIA (HCC): ICD-10-CM

## 2023-05-22 DIAGNOSIS — E78.00 HYPERCHOLESTEROLEMIA WITH LDL GREATER THAN 190 MG/DL: ICD-10-CM

## 2023-05-22 LAB
ALBUMIN SERPL-MCNC: 4.6 G/DL (ref 3.4–5)
ALBUMIN/GLOB SERPL: 2.2 {RATIO} (ref 1.1–2.2)
ALP SERPL-CCNC: 76 U/L (ref 40–129)
ALT SERPL-CCNC: 19 U/L (ref 10–40)
ANION GAP SERPL CALCULATED.3IONS-SCNC: 15 MMOL/L (ref 3–16)
AST SERPL-CCNC: 22 U/L (ref 15–37)
BASOPHILS # BLD: 0 K/UL (ref 0–0.2)
BASOPHILS NFR BLD: 0.4 %
BILIRUB DIRECT SERPL-MCNC: <0.2 MG/DL (ref 0–0.3)
BILIRUB INDIRECT SERPL-MCNC: NORMAL MG/DL (ref 0–1)
BILIRUB SERPL-MCNC: 0.3 MG/DL (ref 0–1)
BUN SERPL-MCNC: 19 MG/DL (ref 7–20)
CALCIUM SERPL-MCNC: 9.4 MG/DL (ref 8.3–10.6)
CHLORIDE SERPL-SCNC: 102 MMOL/L (ref 99–110)
CHOLEST SERPL-MCNC: 132 MG/DL (ref 0–199)
CO2 SERPL-SCNC: 22 MMOL/L (ref 21–32)
CREAT SERPL-MCNC: 1 MG/DL (ref 0.9–1.3)
DEPRECATED RDW RBC AUTO: 15.4 % (ref 12.4–15.4)
EOSINOPHIL # BLD: 0.4 K/UL (ref 0–0.6)
EOSINOPHIL NFR BLD: 5.7 %
GFR SERPLBLD CREATININE-BSD FMLA CKD-EPI: >60 ML/MIN/{1.73_M2}
GLUCOSE SERPL-MCNC: 129 MG/DL (ref 70–99)
HCT VFR BLD AUTO: 40.3 % (ref 40.5–52.5)
HDLC SERPL-MCNC: 53 MG/DL (ref 40–60)
HGB BLD-MCNC: 13 G/DL (ref 13.5–17.5)
LDLC SERPL CALC-MCNC: 59 MG/DL
LYMPHOCYTES # BLD: 2.5 K/UL (ref 1–5.1)
LYMPHOCYTES NFR BLD: 41.3 %
MCH RBC QN AUTO: 29.3 PG (ref 26–34)
MCHC RBC AUTO-ENTMCNC: 32.3 G/DL (ref 31–36)
MCV RBC AUTO: 90.9 FL (ref 80–100)
MONOCYTES # BLD: 0.5 K/UL (ref 0–1.3)
MONOCYTES NFR BLD: 7.8 %
NEUTROPHILS # BLD: 2.8 K/UL (ref 1.7–7.7)
NEUTROPHILS NFR BLD: 44.8 %
PHOSPHATE SERPL-MCNC: 3.3 MG/DL (ref 2.5–4.9)
PLATELET # BLD AUTO: 104 K/UL (ref 135–450)
PMV BLD AUTO: 10.5 FL (ref 5–10.5)
POTASSIUM SERPL-SCNC: 4.2 MMOL/L (ref 3.5–5.1)
PROT SERPL-MCNC: 6.7 G/DL (ref 6.4–8.2)
PSA SERPL DL<=0.01 NG/ML-MCNC: 1 NG/ML (ref 0–4)
RBC # BLD AUTO: 4.44 M/UL (ref 4.2–5.9)
SODIUM SERPL-SCNC: 139 MMOL/L (ref 136–145)
T4 FREE SERPL-MCNC: 1.1 NG/DL (ref 0.9–1.8)
TRIGL SERPL-MCNC: 98 MG/DL (ref 0–150)
TSH SERPL DL<=0.005 MIU/L-ACNC: 1.14 UIU/ML (ref 0.27–4.2)
VLDLC SERPL CALC-MCNC: 20 MG/DL
WBC # BLD AUTO: 6.2 K/UL (ref 4–11)

## 2023-05-22 PROCEDURE — 99396 PREV VISIT EST AGE 40-64: CPT | Performed by: FAMILY MEDICINE

## 2023-05-22 RX ORDER — ROSUVASTATIN CALCIUM 10 MG/1
10 TABLET, COATED ORAL DAILY
Qty: 90 TABLET | Refills: 3 | Status: SHIPPED | OUTPATIENT
Start: 2023-05-22

## 2023-05-22 ASSESSMENT — ENCOUNTER SYMPTOMS
COUGH: 0
CONSTIPATION: 0
NAUSEA: 0
BLOOD IN STOOL: 0
ABDOMINAL PAIN: 0
DIARRHEA: 0
CHEST TIGHTNESS: 0
VOMITING: 0
SHORTNESS OF BREATH: 0
WHEEZING: 0
TROUBLE SWALLOWING: 0
ABDOMINAL DISTENTION: 0
SINUS PRESSURE: 0
BACK PAIN: 0
RHINORRHEA: 0

## 2023-05-23 LAB
EST. AVERAGE GLUCOSE BLD GHB EST-MCNC: 151.3 MG/DL
HBA1C MFR BLD: 6.9 %

## 2023-06-14 DIAGNOSIS — E78.00 HYPERCHOLESTEROLEMIA WITH LDL GREATER THAN 190 MG/DL: ICD-10-CM

## 2023-06-19 RX ORDER — ROSUVASTATIN CALCIUM 10 MG/1
10 TABLET, COATED ORAL DAILY
Qty: 90 TABLET | Refills: 3 | Status: SHIPPED | OUTPATIENT
Start: 2023-06-19

## 2023-07-18 DIAGNOSIS — R73.09 HEMOGLOBIN A1C GREATER THAN 9%, INDICATING POOR DIABETIC CONTROL: ICD-10-CM

## 2023-07-18 RX ORDER — DAPAGLIFLOZIN 10 MG/1
TABLET, FILM COATED ORAL
Qty: 90 TABLET | Refills: 1 | Status: SHIPPED | OUTPATIENT
Start: 2023-07-18

## 2023-08-15 NOTE — PROGRESS NOTES
Brenna Abdullahi   60 y.o. male   1965    HPI:    Patient was last seen by me on 2023. During our last office visit, the main issue(s) that we focused on were:   -Continued current medical regiment. Reason(s) for visit:   Chief Complaint   Patient presents with    Diabetes    Medication Refill    3 Month Follow-Up     -Interval history-    [x] No new significant health event(s)/problem(s) occurred since our last office visit. [x] No new health issues/concerns discussed during today's office visit. [] New health issue(s)/concern(s):    [x] Other noteworthy comment(s):     Pt's mother passed away on his birthday and she was 80years old. -Chronic health issue(s)-    Type II diabetes mellitus:  -Last A1c =      Lab Results   Component Value Date    LABA1C 6.9 2023    LABA1C 7.2 10/24/2022    LABA1C 6.9 2022       Labs Renal Latest Ref Rng & Units 2023 10/24/2022 2021   BUN 7 - 20 mg/dL 19 15 13   Cr 0.9 - 1.3 mg/dL 1.0 1.0 0.9   K 3.5 - 5.1 mmol/L 4.2 4.5 4.8   Na 136 - 145 mmol/L 139 138 133(L)       Lab Results   Component Value Date/Time    LABCREA 172.0 10/24/2022 08:16 AM    LABCREA 174.0 2021 07:50 PM    MALBCR 16.9 10/24/2022 08:16 AM    MALBCR 23.6 2021 07:50 PM       Lab Results   Component Value Date    LDLCALC 59 2023     -Glucose readings (on average):   [x] Fastin's  [] Lunchtime:   [] Dinnertime:   [] Hasn't checked glucose readings frequently. [] No glucose readings reported.   -Glucose reading(s):  [] Low:  [] Max:  -Neuropathy symptoms: [x] Yes - intermittent issue [] No  -Gastroparesis symptoms: [] Yes [x] No   -Visual disturbances: [] Yes [x] No  -Medication adherence: [x] Yes [] No [] N/A  -Other comments:     HTN:  BP Readings from Last 3 Encounters:   23 122/82   23 102/78   23 120/78     -Patient has not been checking BP readings regularly.   -Patient denied issues with frequent headache, chest pain, shortness

## 2023-08-16 DIAGNOSIS — E66.9 TYPE 2 DIABETES MELLITUS WITH OBESITY (HCC): ICD-10-CM

## 2023-08-16 DIAGNOSIS — E11.69 TYPE 2 DIABETES MELLITUS WITH OBESITY (HCC): ICD-10-CM

## 2023-08-21 ENCOUNTER — OFFICE VISIT (OUTPATIENT)
Dept: FAMILY MEDICINE CLINIC | Age: 58
End: 2023-08-21
Payer: COMMERCIAL

## 2023-08-21 VITALS
HEART RATE: 62 BPM | OXYGEN SATURATION: 99 % | SYSTOLIC BLOOD PRESSURE: 122 MMHG | WEIGHT: 213.8 LBS | TEMPERATURE: 97.4 F | DIASTOLIC BLOOD PRESSURE: 82 MMHG | HEIGHT: 68 IN | BODY MASS INDEX: 32.4 KG/M2

## 2023-08-21 DIAGNOSIS — F43.21 GRIEF: ICD-10-CM

## 2023-08-21 DIAGNOSIS — E11.69 TYPE 2 DIABETES MELLITUS WITH OBESITY (HCC): Primary | ICD-10-CM

## 2023-08-21 DIAGNOSIS — E78.00 HYPERCHOLESTEROLEMIA WITH LDL GREATER THAN 190 MG/DL: ICD-10-CM

## 2023-08-21 DIAGNOSIS — E66.9 TYPE 2 DIABETES MELLITUS WITH OBESITY (HCC): Primary | ICD-10-CM

## 2023-08-21 DIAGNOSIS — E66.9 TYPE 2 DIABETES MELLITUS WITH OBESITY (HCC): ICD-10-CM

## 2023-08-21 DIAGNOSIS — E11.69 TYPE 2 DIABETES MELLITUS WITH OBESITY (HCC): ICD-10-CM

## 2023-08-21 DIAGNOSIS — E78.5 TYPE 2 DIABETES MELLITUS WITH HYPERLIPIDEMIA (HCC): ICD-10-CM

## 2023-08-21 DIAGNOSIS — E11.69 TYPE 2 DIABETES MELLITUS WITH HYPERLIPIDEMIA (HCC): ICD-10-CM

## 2023-08-21 PROCEDURE — 3044F HG A1C LEVEL LT 7.0%: CPT | Performed by: FAMILY MEDICINE

## 2023-08-21 PROCEDURE — 99213 OFFICE O/P EST LOW 20 MIN: CPT | Performed by: FAMILY MEDICINE

## 2023-08-21 RX ORDER — EZETIMIBE 10 MG/1
10 TABLET ORAL DAILY
Qty: 90 TABLET | Refills: 3 | Status: CANCELLED | OUTPATIENT
Start: 2023-08-21

## 2023-08-21 ASSESSMENT — ENCOUNTER SYMPTOMS
CHEST TIGHTNESS: 0
NAUSEA: 0
BACK PAIN: 0
SINUS PRESSURE: 0
DIARRHEA: 0
VOMITING: 0
TROUBLE SWALLOWING: 0
BLOOD IN STOOL: 0
SHORTNESS OF BREATH: 0
RHINORRHEA: 0
CONSTIPATION: 0
COUGH: 0
ABDOMINAL PAIN: 0
WHEEZING: 0
ABDOMINAL DISTENTION: 0

## 2023-08-22 LAB
ALBUMIN SERPL-MCNC: 4.8 G/DL (ref 3.4–5)
ALBUMIN/GLOB SERPL: 2 {RATIO} (ref 1.1–2.2)
ALP SERPL-CCNC: 76 U/L (ref 40–129)
ALT SERPL-CCNC: 19 U/L (ref 10–40)
ANION GAP SERPL CALCULATED.3IONS-SCNC: 14 MMOL/L (ref 3–16)
AST SERPL-CCNC: 17 U/L (ref 15–37)
BILIRUB SERPL-MCNC: 0.5 MG/DL (ref 0–1)
BUN SERPL-MCNC: 17 MG/DL (ref 7–20)
CALCIUM SERPL-MCNC: 9.7 MG/DL (ref 8.3–10.6)
CHLORIDE SERPL-SCNC: 100 MMOL/L (ref 99–110)
CO2 SERPL-SCNC: 23 MMOL/L (ref 21–32)
CREAT SERPL-MCNC: 1.2 MG/DL (ref 0.9–1.3)
EST. AVERAGE GLUCOSE BLD GHB EST-MCNC: 151.3 MG/DL
GFR SERPLBLD CREATININE-BSD FMLA CKD-EPI: >60 ML/MIN/{1.73_M2}
GLUCOSE SERPL-MCNC: 119 MG/DL (ref 70–99)
HBA1C MFR BLD: 6.9 %
POTASSIUM SERPL-SCNC: 4.3 MMOL/L (ref 3.5–5.1)
PROT SERPL-MCNC: 7.2 G/DL (ref 6.4–8.2)
SODIUM SERPL-SCNC: 137 MMOL/L (ref 136–145)

## 2023-09-16 DIAGNOSIS — E78.2 MIXED HYPERLIPIDEMIA: ICD-10-CM

## 2023-09-16 DIAGNOSIS — E78.00 HYPERCHOLESTEROLEMIA WITH LDL GREATER THAN 190 MG/DL: ICD-10-CM

## 2023-09-18 RX ORDER — EZETIMIBE 10 MG/1
10 TABLET ORAL DAILY
Qty: 90 TABLET | Refills: 3 | Status: SHIPPED | OUTPATIENT
Start: 2023-09-18

## 2023-11-15 DIAGNOSIS — E11.69 TYPE 2 DIABETES MELLITUS WITH OBESITY (HCC): ICD-10-CM

## 2023-11-15 DIAGNOSIS — E66.9 TYPE 2 DIABETES MELLITUS WITH OBESITY (HCC): ICD-10-CM

## 2023-11-29 NOTE — PROGRESS NOTES
Kia Ontiveros   60 y.o. male   1965    HPI:    Patient was last seen by me on 2023. During our last office visit, the main issue(s) that we focused on were:   -Continued current DM regiment. Reason(s) for visit:   Chief Complaint   Patient presents with    3 Month Follow-Up    Diabetes     -Interval history-    [] No new significant health event(s)/problem(s) occurred since our last office visit. [] No new health issues/concerns discussed during today's office visit. [] New health issue(s)/concern(s):    [] Other noteworthy comment(s):     -Chronic health issue(s)-    Type II diabetes mellitus:  -Last A1c =    Lab Results   Component Value Date    LABA1C 6.9 2023    LABA1C 6.9 2023    LABA1C 7.2 10/24/2022           Latest Ref Rng & Units 2023    11:37 PM 2023     8:18 AM 10/24/2022    10:01 PM 2021     8:30 AM   Labs Renal   BUN 7 - 20 mg/dL 17  19  15  13    Cr 0.9 - 1.3 mg/dL 1.2  1.0  1.0  0.9    K 3.5 - 5.1 mmol/L 4.3  4.2  4.5  4.8    Na 136 - 145 mmol/L 137  139  138  133        Lab Results   Component Value Date/Time    LABCREA 172.0 10/24/2022 08:16 AM    LABCREA 174.0 2021 07:50 PM    MALBCR 16.9 10/24/2022 08:16 AM    MALBCR 23.6 2021 07:50 PM       Lab Results   Component Value Date    LDLCALC 59 2023     -Glucose readings (on average):   [x] Fastin-123  [] Lunchtime:   [] Dinnertime:   [] Hasn't checked glucose readings  [] No glucose readings reported.   -Glucose reading  [] Low:  [] Max: 142  -Neuropathy symptoms: [] Yes [x] No  -Gastroparesis symptoms: [] Yes [x] No   -Visual disturbances: [] Yes [x] No  -Eye exam:   -Last one -  [] Wears glasses/contact lenses   -Medication adherence: [x] Yes [] No [] N/A  -Other comments:     Allergies   Allergen Reactions    Atorvastatin      Other reaction(s): Muscle Aches       Current Outpatient Medications on File Prior to Visit   Medication Sig Dispense Refill    metFORMIN (GLUCOPHAGE)

## 2023-12-07 ENCOUNTER — OFFICE VISIT (OUTPATIENT)
Dept: FAMILY MEDICINE CLINIC | Age: 58
End: 2023-12-07
Payer: COMMERCIAL

## 2023-12-07 VITALS
DIASTOLIC BLOOD PRESSURE: 78 MMHG | BODY MASS INDEX: 32.58 KG/M2 | SYSTOLIC BLOOD PRESSURE: 110 MMHG | HEIGHT: 68 IN | OXYGEN SATURATION: 100 % | HEART RATE: 67 BPM | WEIGHT: 215 LBS

## 2023-12-07 DIAGNOSIS — E11.69 TYPE 2 DIABETES MELLITUS WITH OBESITY (HCC): ICD-10-CM

## 2023-12-07 DIAGNOSIS — E11.69 ONYCHOMYCOSIS OF MULTIPLE TOENAILS WITH TYPE 2 DIABETES MELLITUS (HCC): ICD-10-CM

## 2023-12-07 DIAGNOSIS — E11.69 TYPE 2 DIABETES MELLITUS WITH OBESITY (HCC): Primary | ICD-10-CM

## 2023-12-07 DIAGNOSIS — L60.8 DISCOLORATION OF NAIL: ICD-10-CM

## 2023-12-07 DIAGNOSIS — E66.9 TYPE 2 DIABETES MELLITUS WITH OBESITY (HCC): ICD-10-CM

## 2023-12-07 DIAGNOSIS — B35.1 ONYCHOMYCOSIS OF MULTIPLE TOENAILS WITH TYPE 2 DIABETES MELLITUS (HCC): ICD-10-CM

## 2023-12-07 DIAGNOSIS — E66.9 TYPE 2 DIABETES MELLITUS WITH OBESITY (HCC): Primary | ICD-10-CM

## 2023-12-07 DIAGNOSIS — E78.00 HYPERCHOLESTEROLEMIA WITH LDL GREATER THAN 190 MG/DL: ICD-10-CM

## 2023-12-07 LAB
ALBUMIN SERPL-MCNC: 4.7 G/DL (ref 3.4–5)
ALBUMIN/GLOB SERPL: 2.1 {RATIO} (ref 1.1–2.2)
ALP SERPL-CCNC: 79 U/L (ref 40–129)
ALT SERPL-CCNC: 21 U/L (ref 10–40)
ANION GAP SERPL CALCULATED.3IONS-SCNC: 11 MMOL/L (ref 3–16)
AST SERPL-CCNC: 18 U/L (ref 15–37)
BILIRUB SERPL-MCNC: 0.3 MG/DL (ref 0–1)
BUN SERPL-MCNC: 20 MG/DL (ref 7–20)
CALCIUM SERPL-MCNC: 9.6 MG/DL (ref 8.3–10.6)
CHLORIDE SERPL-SCNC: 102 MMOL/L (ref 99–110)
CHOLEST SERPL-MCNC: 167 MG/DL (ref 0–199)
CO2 SERPL-SCNC: 26 MMOL/L (ref 21–32)
CREAT SERPL-MCNC: 1.1 MG/DL (ref 0.9–1.3)
CREAT UR-MCNC: 154.4 MG/DL (ref 39–259)
GFR SERPLBLD CREATININE-BSD FMLA CKD-EPI: >60 ML/MIN/{1.73_M2}
GLUCOSE SERPL-MCNC: 126 MG/DL (ref 70–99)
HDLC SERPL-MCNC: 48 MG/DL (ref 40–60)
LDLC SERPL CALC-MCNC: 95 MG/DL
MICROALBUMIN UR DL<=1MG/L-MCNC: 2.8 MG/DL
MICROALBUMIN/CREAT UR: 18.1 MG/G (ref 0–30)
POTASSIUM SERPL-SCNC: 4.5 MMOL/L (ref 3.5–5.1)
PROT SERPL-MCNC: 6.9 G/DL (ref 6.4–8.2)
SODIUM SERPL-SCNC: 139 MMOL/L (ref 136–145)
TRIGL SERPL-MCNC: 119 MG/DL (ref 0–150)
VLDLC SERPL CALC-MCNC: 24 MG/DL

## 2023-12-07 PROCEDURE — 99213 OFFICE O/P EST LOW 20 MIN: CPT | Performed by: FAMILY MEDICINE

## 2023-12-07 PROCEDURE — 3044F HG A1C LEVEL LT 7.0%: CPT | Performed by: FAMILY MEDICINE

## 2023-12-07 SDOH — ECONOMIC STABILITY: HOUSING INSECURITY
IN THE LAST 12 MONTHS, WAS THERE A TIME WHEN YOU DID NOT HAVE A STEADY PLACE TO SLEEP OR SLEPT IN A SHELTER (INCLUDING NOW)?: NO

## 2023-12-07 SDOH — ECONOMIC STABILITY: INCOME INSECURITY: HOW HARD IS IT FOR YOU TO PAY FOR THE VERY BASICS LIKE FOOD, HOUSING, MEDICAL CARE, AND HEATING?: NOT HARD AT ALL

## 2023-12-07 SDOH — ECONOMIC STABILITY: FOOD INSECURITY: WITHIN THE PAST 12 MONTHS, YOU WORRIED THAT YOUR FOOD WOULD RUN OUT BEFORE YOU GOT MONEY TO BUY MORE.: NEVER TRUE

## 2023-12-07 SDOH — ECONOMIC STABILITY: FOOD INSECURITY: WITHIN THE PAST 12 MONTHS, THE FOOD YOU BOUGHT JUST DIDN'T LAST AND YOU DIDN'T HAVE MONEY TO GET MORE.: NEVER TRUE

## 2023-12-07 ASSESSMENT — ENCOUNTER SYMPTOMS
NAUSEA: 0
BACK PAIN: 0
TROUBLE SWALLOWING: 0
VOMITING: 0
CONSTIPATION: 0
RHINORRHEA: 0
BLOOD IN STOOL: 0
ABDOMINAL PAIN: 0
SHORTNESS OF BREATH: 0
ABDOMINAL DISTENTION: 0
COUGH: 0
CHEST TIGHTNESS: 0
WHEEZING: 0
DIARRHEA: 0
SINUS PRESSURE: 0

## 2023-12-08 LAB
EST. AVERAGE GLUCOSE BLD GHB EST-MCNC: 157.1 MG/DL
HBA1C MFR BLD: 7.1 %

## 2024-01-29 DIAGNOSIS — E11.69 TYPE 2 DIABETES MELLITUS WITH OBESITY (HCC): ICD-10-CM

## 2024-01-29 DIAGNOSIS — E66.9 TYPE 2 DIABETES MELLITUS WITH OBESITY (HCC): ICD-10-CM

## 2024-01-29 RX ORDER — DAPAGLIFLOZIN 10 MG/1
TABLET, FILM COATED ORAL EVERY MORNING
Qty: 90 TABLET | Refills: 2 | OUTPATIENT
Start: 2024-01-29

## 2024-03-09 NOTE — PROGRESS NOTES
Pt unsure why his A1c went up?    Diet: eats twice a day. Reported he tries to eat sugar-free stuff.    HTN:  BP Readings from Last 3 Encounters:   03/14/24 118/80   12/07/23 110/78   08/21/23 122/82     -Patient has not been checking BP readings regularly.   -Patient denied issues with frequent headache, chest pain, shortness of breath, palpitations, malaise, etc.    Allergies   Allergen Reactions    Atorvastatin      Other reaction(s): Muscle Aches       Current Outpatient Medications on File Prior to Visit   Medication Sig Dispense Refill    ezetimibe (ZETIA) 10 MG tablet TAKE 1 TABLET BY MOUTH IN THE MORNING 90 tablet 3    rosuvastatin (CRESTOR) 10 MG tablet TAKE 1 TABLET BY MOUTH DAILY 90 tablet 3    diclofenac sodium (VOLTAREN) 1 % GEL Apply 2 g topically every 6 hours as needed for Pain (back pain) 150 g 2    FreeStyle Lancets MISC USE WITH GLUCOMETER TO TEST ONCE DAILY AND AS NEEDED FOR IRREGULAR BLOOD GLUCOSE      Lancets Misc. MISC Use with glucometer 100 each 5    blood glucose monitor strips Test 1 time a day prior to breakfast & as needed for symptoms of irregular blood glucose. 100 strip 5     No current facility-administered medications on file prior to visit.        Family History   Problem Relation Age of Onset    Breast Cancer Mother        Social History     Tobacco Use    Smoking status: Never    Smokeless tobacco: Never   Substance Use Topics    Alcohol use: Yes        Lab Results   Component Value Date    WBC 6.2 05/22/2023    HGB 13.0 (L) 05/22/2023    HCT 40.3 (L) 05/22/2023    MCV 90.9 05/22/2023     (L) 05/22/2023         Chemistry        Component Value Date/Time     12/07/2023 0754    K 4.5 12/07/2023 0754     12/07/2023 0754    CO2 26 12/07/2023 0754    BUN 20 12/07/2023 0754    CREATININE 1.1 12/07/2023 0754        Component Value Date/Time    CALCIUM 9.6 12/07/2023 0754    ALKPHOS 79 12/07/2023 0754    AST 18 12/07/2023 0754    ALT 21 12/07/2023 0754    BILITOT

## 2024-03-14 ENCOUNTER — OFFICE VISIT (OUTPATIENT)
Dept: FAMILY MEDICINE CLINIC | Age: 59
End: 2024-03-14
Payer: COMMERCIAL

## 2024-03-14 VITALS
HEART RATE: 63 BPM | WEIGHT: 211.6 LBS | OXYGEN SATURATION: 99 % | SYSTOLIC BLOOD PRESSURE: 118 MMHG | HEIGHT: 68 IN | DIASTOLIC BLOOD PRESSURE: 80 MMHG | BODY MASS INDEX: 32.07 KG/M2

## 2024-03-14 DIAGNOSIS — E78.5 TYPE 2 DIABETES MELLITUS WITH HYPERLIPIDEMIA (HCC): ICD-10-CM

## 2024-03-14 DIAGNOSIS — E78.00 HYPERCHOLESTEROLEMIA WITH LDL GREATER THAN 190 MG/DL: ICD-10-CM

## 2024-03-14 DIAGNOSIS — E11.69 TYPE 2 DIABETES MELLITUS WITH HYPERLIPIDEMIA (HCC): ICD-10-CM

## 2024-03-14 DIAGNOSIS — E11.69 TYPE 2 DIABETES MELLITUS WITH OBESITY (HCC): Primary | ICD-10-CM

## 2024-03-14 DIAGNOSIS — E66.9 TYPE 2 DIABETES MELLITUS WITH OBESITY (HCC): Primary | ICD-10-CM

## 2024-03-14 LAB — HBA1C MFR BLD: 8 %

## 2024-03-14 PROCEDURE — 99213 OFFICE O/P EST LOW 20 MIN: CPT | Performed by: FAMILY MEDICINE

## 2024-03-14 PROCEDURE — 3052F HG A1C>EQUAL 8.0%<EQUAL 9.0%: CPT | Performed by: FAMILY MEDICINE

## 2024-03-14 PROCEDURE — 83036 HEMOGLOBIN GLYCOSYLATED A1C: CPT | Performed by: FAMILY MEDICINE

## 2024-03-14 RX ORDER — DAPAGLIFLOZIN 10 MG/1
10 TABLET, FILM COATED ORAL EVERY MORNING
Qty: 90 TABLET | Refills: 2 | Status: SHIPPED | OUTPATIENT
Start: 2024-03-14

## 2024-03-14 ASSESSMENT — ENCOUNTER SYMPTOMS
CONSTIPATION: 0
NAUSEA: 0
SINUS PRESSURE: 0
CHEST TIGHTNESS: 0
BLOOD IN STOOL: 0
COUGH: 0
VOMITING: 0
ABDOMINAL DISTENTION: 0
TROUBLE SWALLOWING: 0
WHEEZING: 0
RHINORRHEA: 0
SHORTNESS OF BREATH: 0
BACK PAIN: 0
ABDOMINAL PAIN: 0
DIARRHEA: 0

## 2024-03-14 ASSESSMENT — PATIENT HEALTH QUESTIONNAIRE - PHQ9
SUM OF ALL RESPONSES TO PHQ9 QUESTIONS 1 & 2: 0
SUM OF ALL RESPONSES TO PHQ QUESTIONS 1-9: 0
1. LITTLE INTEREST OR PLEASURE IN DOING THINGS: 0
SUM OF ALL RESPONSES TO PHQ QUESTIONS 1-9: 0
2. FEELING DOWN, DEPRESSED OR HOPELESS: 0

## 2024-06-08 NOTE — PROGRESS NOTES
Senbrigido Able   58 y.o. male   1965    HPI:    Patient was last seen by me on 3/14/2024.  During our last office visit, the main issue(s) that we focused on were:   -Continued current regiment.    Reason(s) for visit:   Chief Complaint   Patient presents with    Follow-up     -Interval history-    [x] No new significant health event(s)/problem(s) occurred since our last office visit.    [x] No new health issues/concerns discussed during today's office visit.    [] New health issue(s)/concern(s):    [] Other noteworthy comment(s):     -Chronic health issue(s)-    Type II diabetes mellitus:  -Last A1c =    Lab Results   Component Value Date    LABA1C 8.0 2024    LABA1C 7.1 2023    LABA1C 6.9 2023           Latest Ref Rng & Units 2023     7:54 AM 2023    11:37 PM 2023     8:18 AM 10/24/2022    10:01 PM 2021     8:30 AM   Labs Renal   BUN 7 - 20 mg/dL 20  17  19  15  13    Cr 0.9 - 1.3 mg/dL 1.1  1.2  1.0  1.0  0.9    K 3.5 - 5.1 mmol/L 4.5  4.3  4.2  4.5  4.8    Na 136 - 145 mmol/L 139  137  139  138  133        Lab Results   Component Value Date/Time    MALBCR 18.1 2023 08:19 AM    MALBCR 16.9 10/24/2022 08:16 AM    MALBCR 23.6 2021 07:50 PM       Lab Results   Component Value Date    LDL 95 2023       -Glucose readings (on average):   [x] Fastin's.   [] Lunchtime:   [] Dinnertime:   [] Hasn't checked glucose readings  [] No glucose readings reported.  -Glucose reading  [] Low:  [] Max:  -Neuropathy symptoms: [] Yes [x] No  -Gastroparesis symptoms: [] Yes [x] No   -Visual disturbances: [] Yes [x] No  -Eye exam:   -Last one -  [] Wears glasses/contact lenses   -Medication adherence: [x] Yes [] No [] N/A  -Other comments:     Pt stated that he would like to get off of Metformin because it requires more tests for his job.    Allergies   Allergen Reactions    Atorvastatin      Other reaction(s): Muscle Aches       Current Outpatient Medications on File

## 2024-06-13 ENCOUNTER — OFFICE VISIT (OUTPATIENT)
Dept: FAMILY MEDICINE CLINIC | Age: 59
End: 2024-06-13
Payer: COMMERCIAL

## 2024-06-13 VITALS
TEMPERATURE: 97.2 F | DIASTOLIC BLOOD PRESSURE: 65 MMHG | HEART RATE: 53 BPM | BODY MASS INDEX: 32.28 KG/M2 | OXYGEN SATURATION: 99 % | WEIGHT: 213 LBS | SYSTOLIC BLOOD PRESSURE: 120 MMHG | HEIGHT: 68 IN

## 2024-06-13 DIAGNOSIS — E11.65 UNCONTROLLED TYPE 2 DIABETES MELLITUS WITH HYPERGLYCEMIA (HCC): ICD-10-CM

## 2024-06-13 DIAGNOSIS — B35.1 ONYCHOMYCOSIS OF MULTIPLE TOENAILS WITH TYPE 2 DIABETES MELLITUS (HCC): ICD-10-CM

## 2024-06-13 DIAGNOSIS — E78.5 TYPE 2 DIABETES MELLITUS WITH HYPERLIPIDEMIA (HCC): ICD-10-CM

## 2024-06-13 DIAGNOSIS — Z71.89 ENCOUNTER FOR MEDICATION REVIEW AND COUNSELING: Primary | ICD-10-CM

## 2024-06-13 DIAGNOSIS — E78.00 HYPERCHOLESTEROLEMIA WITH LDL GREATER THAN 190 MG/DL: ICD-10-CM

## 2024-06-13 DIAGNOSIS — E11.69 TYPE 2 DIABETES MELLITUS WITH OBESITY (HCC): ICD-10-CM

## 2024-06-13 DIAGNOSIS — E11.69 ONYCHOMYCOSIS OF MULTIPLE TOENAILS WITH TYPE 2 DIABETES MELLITUS (HCC): ICD-10-CM

## 2024-06-13 DIAGNOSIS — E66.9 TYPE 2 DIABETES MELLITUS WITH OBESITY (HCC): ICD-10-CM

## 2024-06-13 DIAGNOSIS — E11.69 TYPE 2 DIABETES MELLITUS WITH HYPERLIPIDEMIA (HCC): ICD-10-CM

## 2024-06-13 LAB
ALBUMIN SERPL-MCNC: 4.5 G/DL (ref 3.4–5)
ALBUMIN/GLOB SERPL: 2 {RATIO} (ref 1.1–2.2)
ALP SERPL-CCNC: 83 U/L (ref 40–129)
ALT SERPL-CCNC: 22 U/L (ref 10–40)
ANION GAP SERPL CALCULATED.3IONS-SCNC: 10 MMOL/L (ref 3–16)
AST SERPL-CCNC: 17 U/L (ref 15–37)
BILIRUB SERPL-MCNC: 0.8 MG/DL (ref 0–1)
BUN SERPL-MCNC: 12 MG/DL (ref 7–20)
CALCIUM SERPL-MCNC: 9.6 MG/DL (ref 8.3–10.6)
CHLORIDE SERPL-SCNC: 99 MMOL/L (ref 99–110)
CHOLEST SERPL-MCNC: 146 MG/DL (ref 0–199)
CO2 SERPL-SCNC: 27 MMOL/L (ref 21–32)
CREAT SERPL-MCNC: 1 MG/DL (ref 0.9–1.3)
GFR SERPLBLD CREATININE-BSD FMLA CKD-EPI: 86 ML/MIN/{1.73_M2}
GLUCOSE SERPL-MCNC: 124 MG/DL (ref 70–99)
HDLC SERPL-MCNC: 46 MG/DL (ref 40–60)
LDLC SERPL CALC-MCNC: 80 MG/DL
POTASSIUM SERPL-SCNC: 4.2 MMOL/L (ref 3.5–5.1)
PROT SERPL-MCNC: 6.8 G/DL (ref 6.4–8.2)
SODIUM SERPL-SCNC: 136 MMOL/L (ref 136–145)
TRIGL SERPL-MCNC: 99 MG/DL (ref 0–150)
VLDLC SERPL CALC-MCNC: 20 MG/DL

## 2024-06-13 PROCEDURE — 99213 OFFICE O/P EST LOW 20 MIN: CPT | Performed by: FAMILY MEDICINE

## 2024-06-13 PROCEDURE — 3052F HG A1C>EQUAL 8.0%<EQUAL 9.0%: CPT | Performed by: FAMILY MEDICINE

## 2024-06-13 ASSESSMENT — ENCOUNTER SYMPTOMS
ABDOMINAL PAIN: 0
COUGH: 0
SINUS PRESSURE: 0
CHEST TIGHTNESS: 0
RHINORRHEA: 0
BACK PAIN: 0
BLOOD IN STOOL: 0
WHEEZING: 0
NAUSEA: 0
TROUBLE SWALLOWING: 0
SHORTNESS OF BREATH: 0
DIARRHEA: 0
CONSTIPATION: 0
VOMITING: 0
ABDOMINAL DISTENTION: 0

## 2024-06-20 DIAGNOSIS — E78.00 HYPERCHOLESTEROLEMIA WITH LDL GREATER THAN 190 MG/DL: ICD-10-CM

## 2024-06-20 RX ORDER — ROSUVASTATIN CALCIUM 10 MG/1
10 TABLET, COATED ORAL DAILY
Qty: 90 TABLET | Refills: 3 | OUTPATIENT
Start: 2024-06-20

## 2024-08-31 PROBLEM — E78.5 TYPE 2 DIABETES MELLITUS WITH HYPERLIPIDEMIA (HCC): Status: ACTIVE | Noted: 2022-10-24

## 2024-08-31 PROBLEM — E11.69 TYPE 2 DIABETES MELLITUS WITH HYPERLIPIDEMIA (HCC): Status: ACTIVE | Noted: 2022-10-24

## 2024-09-12 ENCOUNTER — TELEPHONE (OUTPATIENT)
Dept: FAMILY MEDICINE CLINIC | Age: 59
End: 2024-09-12

## 2024-09-12 ENCOUNTER — OFFICE VISIT (OUTPATIENT)
Dept: FAMILY MEDICINE CLINIC | Age: 59
End: 2024-09-12
Payer: COMMERCIAL

## 2024-09-12 VITALS
DIASTOLIC BLOOD PRESSURE: 80 MMHG | BODY MASS INDEX: 31.95 KG/M2 | WEIGHT: 210.8 LBS | SYSTOLIC BLOOD PRESSURE: 130 MMHG | HEIGHT: 68 IN | HEART RATE: 70 BPM | OXYGEN SATURATION: 99 %

## 2024-09-12 DIAGNOSIS — E66.9 TYPE 2 DIABETES MELLITUS WITH OBESITY (HCC): ICD-10-CM

## 2024-09-12 DIAGNOSIS — E11.69 ONYCHOMYCOSIS OF MULTIPLE TOENAILS WITH TYPE 2 DIABETES MELLITUS (HCC): ICD-10-CM

## 2024-09-12 DIAGNOSIS — E78.00 HYPERCHOLESTEROLEMIA WITH LDL GREATER THAN 190 MG/DL: ICD-10-CM

## 2024-09-12 DIAGNOSIS — L60.2 NAIL HYPERTROPHY: ICD-10-CM

## 2024-09-12 DIAGNOSIS — R09.89 CHEST CONGESTION: ICD-10-CM

## 2024-09-12 DIAGNOSIS — B35.1 ONYCHOMYCOSIS OF MULTIPLE TOENAILS WITH TYPE 2 DIABETES MELLITUS (HCC): ICD-10-CM

## 2024-09-12 DIAGNOSIS — Z13.0 SCREENING FOR ENDOCRINE, NUTRITIONAL, METABOLIC AND IMMUNITY DISORDER: ICD-10-CM

## 2024-09-12 DIAGNOSIS — E11.69 TYPE 2 DIABETES MELLITUS WITH OBESITY (HCC): Primary | ICD-10-CM

## 2024-09-12 DIAGNOSIS — Z13.29 SCREENING FOR ENDOCRINE, NUTRITIONAL, METABOLIC AND IMMUNITY DISORDER: ICD-10-CM

## 2024-09-12 DIAGNOSIS — Z12.5 SCREENING FOR MALIGNANT NEOPLASM OF PROSTATE: ICD-10-CM

## 2024-09-12 DIAGNOSIS — Z13.228 SCREENING FOR ENDOCRINE, NUTRITIONAL, METABOLIC AND IMMUNITY DISORDER: ICD-10-CM

## 2024-09-12 DIAGNOSIS — E78.5 TYPE 2 DIABETES MELLITUS WITH HYPERLIPIDEMIA (HCC): ICD-10-CM

## 2024-09-12 DIAGNOSIS — E66.9 TYPE 2 DIABETES MELLITUS WITH OBESITY (HCC): Primary | ICD-10-CM

## 2024-09-12 DIAGNOSIS — E11.69 TYPE 2 DIABETES MELLITUS WITH OBESITY (HCC): ICD-10-CM

## 2024-09-12 DIAGNOSIS — E11.69 TYPE 2 DIABETES MELLITUS WITH HYPERLIPIDEMIA (HCC): ICD-10-CM

## 2024-09-12 DIAGNOSIS — Z00.00 ENCOUNTER FOR PREVENTATIVE ADULT HEALTH CARE EXAMINATION: Primary | ICD-10-CM

## 2024-09-12 DIAGNOSIS — Z13.21 SCREENING FOR ENDOCRINE, NUTRITIONAL, METABOLIC AND IMMUNITY DISORDER: ICD-10-CM

## 2024-09-12 DIAGNOSIS — Z71.9 HEALTH EDUCATION/COUNSELING: ICD-10-CM

## 2024-09-12 LAB — HBA1C MFR BLD: 7.5 %

## 2024-09-12 PROCEDURE — 99396 PREV VISIT EST AGE 40-64: CPT | Performed by: FAMILY MEDICINE

## 2024-09-12 PROCEDURE — 83036 HEMOGLOBIN GLYCOSYLATED A1C: CPT | Performed by: FAMILY MEDICINE

## 2024-09-12 RX ORDER — EZETIMIBE 10 MG/1
10 TABLET ORAL DAILY
Qty: 90 TABLET | Refills: 3 | Status: SHIPPED | OUTPATIENT
Start: 2024-09-12

## 2024-09-12 RX ORDER — DAPAGLIFLOZIN 10 MG/1
10 TABLET, FILM COATED ORAL EVERY MORNING
Qty: 90 TABLET | Refills: 2 | Status: SHIPPED | OUTPATIENT
Start: 2024-09-12 | End: 2024-09-12 | Stop reason: ALTCHOICE

## 2024-09-12 RX ORDER — BROMPHENIRAMINE MALEATE, PSEUDOEPHEDRINE HYDROCHLORIDE, AND DEXTROMETHORPHAN HYDROBROMIDE 2; 30; 10 MG/5ML; MG/5ML; MG/5ML
5 SYRUP ORAL EVERY 6 HOURS PRN
Qty: 118 ML | Refills: 0 | Status: SHIPPED | OUTPATIENT
Start: 2024-09-12

## 2024-09-12 RX ORDER — DOXYCYCLINE HYCLATE 100 MG
100 TABLET ORAL 2 TIMES DAILY
Qty: 14 TABLET | Refills: 0 | Status: SHIPPED | OUTPATIENT
Start: 2024-09-12 | End: 2024-09-19

## 2024-09-12 RX ORDER — ROSUVASTATIN CALCIUM 10 MG/1
10 TABLET, COATED ORAL DAILY
Qty: 90 TABLET | Refills: 3 | Status: SHIPPED | OUTPATIENT
Start: 2024-09-12

## 2024-09-12 ASSESSMENT — PATIENT HEALTH QUESTIONNAIRE - PHQ9
1. LITTLE INTEREST OR PLEASURE IN DOING THINGS: NOT AT ALL
SUM OF ALL RESPONSES TO PHQ QUESTIONS 1-9: 0
2. FEELING DOWN, DEPRESSED OR HOPELESS: NOT AT ALL
SUM OF ALL RESPONSES TO PHQ9 QUESTIONS 1 & 2: 0

## 2024-09-12 ASSESSMENT — ENCOUNTER SYMPTOMS
NAUSEA: 0
WHEEZING: 0
VOMITING: 0
BLOOD IN STOOL: 0
BACK PAIN: 0
SINUS PRESSURE: 0
CONSTIPATION: 0
TROUBLE SWALLOWING: 0
CHEST TIGHTNESS: 0
RHINORRHEA: 0
ABDOMINAL PAIN: 0
COUGH: 0
SHORTNESS OF BREATH: 0
ABDOMINAL DISTENTION: 0
DIARRHEA: 0

## 2024-09-12 NOTE — TELEPHONE ENCOUNTER
Called Pharmacy spoke with Colleen requested medication list. Patient also has not gotten farxiga since 12/2023

## 2024-09-12 NOTE — TELEPHONE ENCOUNTER
Farxiga may not be covered. Please do PA to see of Jardiance is covered.    Parish Bernstein MD  Family Medicine  [facility]

## 2024-09-13 NOTE — TELEPHONE ENCOUNTER
Submitted PA for Jardiance 25MG tablets  Via [facility] Key: [id number]  STATUS: PENDING.    Follow up done daily; if no decision with in three days we will refax.  If another three days goes by with no decision will call the insurance for status.

## 2024-11-12 NOTE — PROGRESS NOTES
Senator Able   59 y.o. male   1965    HPI:    Patient was last seen by me on 9/12/2024.  During our last office visit, the main issue(s) that we focused on were:   -Annual physical.    Reason(s) for visit:   Chief Complaint   Patient presents with    Follow-up     DM, HLD      -Interval history-    [x] New health issue(s)/concern(s):    Pt reported of feeling of \"water in my (right) ear\" for past couple of weeks. He denied issues ear pain, discharge, tinnitus, decreased hearing, etc. He denied issues with jaw claudication.    [] No new significant health event(s)/problem(s) occurred since our last office visit.    [] No new health issues/concerns discussed during today's office visit.    [x] Other noteworthy comment(s):     Discussed lab results below.    Orders Only on 12/03/2024   Component Date Value Ref Range Status    WBC 12/03/2024 6.0  4.0 - 11.0 K/uL Final    RBC 12/03/2024 4.31  4.20 - 5.90 M/uL Final    Hemoglobin 12/03/2024 12.6 (L)  13.5 - 17.5 g/dL Final    Hematocrit 12/03/2024 39.1 (L)  40.5 - 52.5 % Final    MCV 12/03/2024 90.8  80.0 - 100.0 fL Final    MCH 12/03/2024 29.2  26.0 - 34.0 pg Final    MCHC 12/03/2024 32.2  31.0 - 36.0 g/dL Final    RDW 12/03/2024 14.4  12.4 - 15.4 % Final    Platelets 12/03/2024 126 (L)  135 - 450 K/uL Final    Large and giant platelets seen.    MPV 12/03/2024 11.5 (H)  5.0 - 10.5 fL Final    PLATELET SLIDE REVIEW 12/03/2024 Decreased   Final    SLIDE REVIEW 12/03/2024 see below   Final    Slide review agrees with reported results    Neutrophils % 12/03/2024 41.1  % Final    Lymphocytes % 12/03/2024 44.6  % Final    Monocytes % 12/03/2024 8.1  % Final    Eosinophils % 12/03/2024 5.7  % Final    Basophils % 12/03/2024 0.5  % Final    Neutrophils Absolute 12/03/2024 2.5  1.7 - 7.7 K/uL Final    Lymphocytes Absolute 12/03/2024 2.7  1.0 - 5.1 K/uL Final    Monocytes Absolute 12/03/2024 0.5  0.0 - 1.3 K/uL Final    Eosinophils Absolute 12/03/2024 0.3  0.0 - 0.6 K/uL

## 2024-12-03 DIAGNOSIS — Z00.00 ENCOUNTER FOR PREVENTATIVE ADULT HEALTH CARE EXAMINATION: ICD-10-CM

## 2024-12-03 DIAGNOSIS — Z13.228 SCREENING FOR ENDOCRINE, NUTRITIONAL, METABOLIC AND IMMUNITY DISORDER: ICD-10-CM

## 2024-12-03 DIAGNOSIS — Z13.21 SCREENING FOR ENDOCRINE, NUTRITIONAL, METABOLIC AND IMMUNITY DISORDER: ICD-10-CM

## 2024-12-03 DIAGNOSIS — Z13.0 SCREENING FOR ENDOCRINE, NUTRITIONAL, METABOLIC AND IMMUNITY DISORDER: ICD-10-CM

## 2024-12-03 DIAGNOSIS — Z13.29 SCREENING FOR ENDOCRINE, NUTRITIONAL, METABOLIC AND IMMUNITY DISORDER: ICD-10-CM

## 2024-12-03 DIAGNOSIS — Z71.9 HEALTH EDUCATION/COUNSELING: ICD-10-CM

## 2024-12-04 LAB
ALBUMIN SERPL-MCNC: 4.7 G/DL (ref 3.4–5)
ALBUMIN/GLOB SERPL: 2 {RATIO} (ref 1.1–2.2)
ALP SERPL-CCNC: 80 U/L (ref 40–129)
ALT SERPL-CCNC: 30 U/L (ref 10–40)
ANION GAP SERPL CALCULATED.3IONS-SCNC: 9 MMOL/L (ref 3–16)
AST SERPL-CCNC: 26 U/L (ref 15–37)
BASOPHILS # BLD: 0 K/UL (ref 0–0.2)
BASOPHILS NFR BLD: 0.5 %
BILIRUB SERPL-MCNC: 0.6 MG/DL (ref 0–1)
BUN SERPL-MCNC: 18 MG/DL (ref 7–20)
BURR CELLS BLD QL SMEAR: ABNORMAL
CALCIUM SERPL-MCNC: 9.4 MG/DL (ref 8.3–10.6)
CHLORIDE SERPL-SCNC: 103 MMOL/L (ref 99–110)
CHOLEST SERPL-MCNC: 165 MG/DL (ref 0–199)
CO2 SERPL-SCNC: 26 MMOL/L (ref 21–32)
CREAT SERPL-MCNC: 1.1 MG/DL (ref 0.8–1.3)
DEPRECATED RDW RBC AUTO: 14.4 % (ref 12.4–15.4)
EOSINOPHIL # BLD: 0.3 K/UL (ref 0–0.6)
EOSINOPHIL NFR BLD: 5.7 %
GFR SERPLBLD CREATININE-BSD FMLA CKD-EPI: 77 ML/MIN/{1.73_M2}
GLUCOSE SERPL-MCNC: 101 MG/DL (ref 70–99)
HCT VFR BLD AUTO: 39.1 % (ref 40.5–52.5)
HDLC SERPL-MCNC: 53 MG/DL (ref 40–60)
HGB BLD-MCNC: 12.6 G/DL (ref 13.5–17.5)
LDLC SERPL CALC-MCNC: 92 MG/DL
LYMPHOCYTES # BLD: 2.7 K/UL (ref 1–5.1)
LYMPHOCYTES NFR BLD: 44.6 %
MCH RBC QN AUTO: 29.2 PG (ref 26–34)
MCHC RBC AUTO-ENTMCNC: 32.2 G/DL (ref 31–36)
MCV RBC AUTO: 90.8 FL (ref 80–100)
MONOCYTES # BLD: 0.5 K/UL (ref 0–1.3)
MONOCYTES NFR BLD: 8.1 %
NEUTROPHILS # BLD: 2.5 K/UL (ref 1.7–7.7)
NEUTROPHILS NFR BLD: 41.1 %
PLATELET # BLD AUTO: 126 K/UL (ref 135–450)
PLATELET BLD QL SMEAR: ABNORMAL
PMV BLD AUTO: 11.5 FL (ref 5–10.5)
POIKILOCYTOSIS BLD QL SMEAR: ABNORMAL
POTASSIUM SERPL-SCNC: 4.7 MMOL/L (ref 3.5–5.1)
PROT SERPL-MCNC: 7 G/DL (ref 6.4–8.2)
PSA SERPL DL<=0.01 NG/ML-MCNC: 0.56 NG/ML (ref 0–4)
RBC # BLD AUTO: 4.31 M/UL (ref 4.2–5.9)
SLIDE REVIEW: ABNORMAL
SODIUM SERPL-SCNC: 138 MMOL/L (ref 136–145)
T4 FREE SERPL-MCNC: 0.9 NG/DL (ref 0.9–1.8)
TRIGL SERPL-MCNC: 101 MG/DL (ref 0–150)
TSH SERPL DL<=0.005 MIU/L-ACNC: 1.46 UIU/ML (ref 0.27–4.2)
VLDLC SERPL CALC-MCNC: 20 MG/DL
WBC # BLD AUTO: 6 K/UL (ref 4–11)

## 2024-12-05 ENCOUNTER — OFFICE VISIT (OUTPATIENT)
Dept: FAMILY MEDICINE CLINIC | Age: 59
End: 2024-12-05

## 2024-12-05 VITALS
WEIGHT: 211.2 LBS | DIASTOLIC BLOOD PRESSURE: 80 MMHG | BODY MASS INDEX: 32.01 KG/M2 | HEIGHT: 68 IN | SYSTOLIC BLOOD PRESSURE: 112 MMHG | HEART RATE: 71 BPM | OXYGEN SATURATION: 99 %

## 2024-12-05 DIAGNOSIS — E11.69 TYPE 2 DIABETES MELLITUS WITH OBESITY (HCC): ICD-10-CM

## 2024-12-05 DIAGNOSIS — D64.9 NORMOCYTIC ANEMIA: Primary | ICD-10-CM

## 2024-12-05 DIAGNOSIS — Z91.89 RISK OF MYOCARDIAL INFARCTION OR STROKE 7.5% OR GREATER IN NEXT 10 YEARS: ICD-10-CM

## 2024-12-05 DIAGNOSIS — E78.5 TYPE 2 DIABETES MELLITUS WITH HYPERLIPIDEMIA (HCC): ICD-10-CM

## 2024-12-05 DIAGNOSIS — Z98.890 HISTORY OF HEMORRHOIDECTOMY: ICD-10-CM

## 2024-12-05 DIAGNOSIS — H61.21 EXCESSIVE CERUMEN IN EAR CANAL, RIGHT: ICD-10-CM

## 2024-12-05 DIAGNOSIS — Z13.228 SCREENING FOR ENDOCRINE, METABOLIC AND IMMUNITY DISORDER: ICD-10-CM

## 2024-12-05 DIAGNOSIS — E78.00 HYPERCHOLESTEROLEMIA WITH LDL GREATER THAN 190 MG/DL: ICD-10-CM

## 2024-12-05 DIAGNOSIS — E11.69 TYPE 2 DIABETES MELLITUS WITH HYPERLIPIDEMIA (HCC): ICD-10-CM

## 2024-12-05 DIAGNOSIS — H66.001 NON-RECURRENT ACUTE SUPPURATIVE OTITIS MEDIA OF RIGHT EAR WITHOUT SPONTANEOUS RUPTURE OF TYMPANIC MEMBRANE: ICD-10-CM

## 2024-12-05 DIAGNOSIS — Z13.29 SCREENING FOR ENDOCRINE, METABOLIC AND IMMUNITY DISORDER: ICD-10-CM

## 2024-12-05 DIAGNOSIS — E66.9 TYPE 2 DIABETES MELLITUS WITH OBESITY (HCC): ICD-10-CM

## 2024-12-05 DIAGNOSIS — Z71.9 HEALTH EDUCATION/COUNSELING: ICD-10-CM

## 2024-12-05 DIAGNOSIS — Z13.0 SCREENING FOR ENDOCRINE, METABOLIC AND IMMUNITY DISORDER: ICD-10-CM

## 2024-12-05 RX ORDER — NEOMYCIN SULFATE, POLYMYXIN B SULFATE, HYDROCORTISONE 3.5; 10000; 1 MG/ML; [USP'U]/ML; MG/ML
3 SOLUTION/ DROPS AURICULAR (OTIC) 3 TIMES DAILY
Qty: 5 ML | Refills: 0 | Status: SHIPPED | OUTPATIENT
Start: 2024-12-05 | End: 2024-12-15

## 2024-12-05 ASSESSMENT — ENCOUNTER SYMPTOMS
WHEEZING: 0
SHORTNESS OF BREATH: 0
COUGH: 0
TROUBLE SWALLOWING: 0
BACK PAIN: 0
VOMITING: 0
BLOOD IN STOOL: 0
CHEST TIGHTNESS: 0
SINUS PRESSURE: 0
CONSTIPATION: 0
ABDOMINAL PAIN: 0
DIARRHEA: 0
NAUSEA: 0
ABDOMINAL DISTENTION: 0
RHINORRHEA: 0

## 2025-08-15 ENCOUNTER — OFFICE VISIT (OUTPATIENT)
Dept: FAMILY MEDICINE CLINIC | Age: 60
End: 2025-08-15
Payer: COMMERCIAL

## 2025-08-15 VITALS
SYSTOLIC BLOOD PRESSURE: 118 MMHG | OXYGEN SATURATION: 98 % | DIASTOLIC BLOOD PRESSURE: 80 MMHG | BODY MASS INDEX: 32.51 KG/M2 | HEART RATE: 72 BPM | WEIGHT: 213.8 LBS

## 2025-08-15 DIAGNOSIS — E78.00 HYPERCHOLESTEROLEMIA WITH LDL GREATER THAN 190 MG/DL: ICD-10-CM

## 2025-08-15 DIAGNOSIS — Z71.89 ENCOUNTER FOR MEDICATION REVIEW AND COUNSELING: ICD-10-CM

## 2025-08-15 DIAGNOSIS — Z91.89 RISK OF MYOCARDIAL INFARCTION OR STROKE 7.5% OR GREATER IN NEXT 10 YEARS: ICD-10-CM

## 2025-08-15 DIAGNOSIS — Z13.29 SCREENING FOR ENDOCRINE, NUTRITIONAL, METABOLIC AND IMMUNITY DISORDER: ICD-10-CM

## 2025-08-15 DIAGNOSIS — E78.5 TYPE 2 DIABETES MELLITUS WITH HYPERLIPIDEMIA (HCC): ICD-10-CM

## 2025-08-15 DIAGNOSIS — D69.6 THROMBOCYTOPENIA: ICD-10-CM

## 2025-08-15 DIAGNOSIS — Z13.21 SCREENING FOR ENDOCRINE, NUTRITIONAL, METABOLIC AND IMMUNITY DISORDER: ICD-10-CM

## 2025-08-15 DIAGNOSIS — E11.69 TYPE 2 DIABETES MELLITUS WITH OBESITY (HCC): ICD-10-CM

## 2025-08-15 DIAGNOSIS — E66.9 TYPE 2 DIABETES MELLITUS WITH OBESITY (HCC): ICD-10-CM

## 2025-08-15 DIAGNOSIS — D64.9 NORMOCYTIC ANEMIA: Primary | ICD-10-CM

## 2025-08-15 DIAGNOSIS — E11.69 TYPE 2 DIABETES MELLITUS WITH HYPERLIPIDEMIA (HCC): ICD-10-CM

## 2025-08-15 DIAGNOSIS — Z13.228 SCREENING FOR ENDOCRINE, NUTRITIONAL, METABOLIC AND IMMUNITY DISORDER: ICD-10-CM

## 2025-08-15 DIAGNOSIS — Z02.89 ENCOUNTER FOR COMPLETION OF FORM WITH PATIENT: ICD-10-CM

## 2025-08-15 DIAGNOSIS — Z13.0 SCREENING FOR ENDOCRINE, NUTRITIONAL, METABOLIC AND IMMUNITY DISORDER: ICD-10-CM

## 2025-08-15 PROCEDURE — 99214 OFFICE O/P EST MOD 30 MIN: CPT | Performed by: FAMILY MEDICINE

## 2025-08-15 RX ORDER — ROSUVASTATIN CALCIUM 20 MG/1
20 TABLET, COATED ORAL DAILY
Qty: 90 TABLET | Refills: 3 | Status: SHIPPED | OUTPATIENT
Start: 2025-08-15

## 2025-08-15 RX ORDER — EZETIMIBE 10 MG/1
10 TABLET ORAL DAILY
Qty: 90 TABLET | Refills: 3 | Status: SHIPPED | OUTPATIENT
Start: 2025-08-15

## 2025-08-15 RX ORDER — EMPAGLIFLOZIN, LINAGLIPTIN, METFORMIN HYDROCHLORIDE 12.5; 2.5; 1 MG/1; MG/1; MG/1
TABLET, EXTENDED RELEASE ORAL
Qty: 180 TABLET | Refills: 3 | Status: SHIPPED | OUTPATIENT
Start: 2025-08-15

## 2025-08-15 SDOH — ECONOMIC STABILITY: FOOD INSECURITY: WITHIN THE PAST 12 MONTHS, THE FOOD YOU BOUGHT JUST DIDN'T LAST AND YOU DIDN'T HAVE MONEY TO GET MORE.: NEVER TRUE

## 2025-08-15 SDOH — ECONOMIC STABILITY: FOOD INSECURITY: WITHIN THE PAST 12 MONTHS, YOU WORRIED THAT YOUR FOOD WOULD RUN OUT BEFORE YOU GOT MONEY TO BUY MORE.: NEVER TRUE

## 2025-08-15 ASSESSMENT — PATIENT HEALTH QUESTIONNAIRE - PHQ9
SUM OF ALL RESPONSES TO PHQ QUESTIONS 1-9: 0
2. FEELING DOWN, DEPRESSED OR HOPELESS: NOT AT ALL
1. LITTLE INTEREST OR PLEASURE IN DOING THINGS: NOT AT ALL
SUM OF ALL RESPONSES TO PHQ QUESTIONS 1-9: 0

## 2025-08-15 ASSESSMENT — ENCOUNTER SYMPTOMS
WHEEZING: 0
COUGH: 0
DIARRHEA: 0
VOMITING: 0
CHEST TIGHTNESS: 0
BACK PAIN: 0
TROUBLE SWALLOWING: 0
NAUSEA: 0
BLOOD IN STOOL: 0
CONSTIPATION: 0
SINUS PRESSURE: 0
SHORTNESS OF BREATH: 0
RHINORRHEA: 0
ABDOMINAL DISTENTION: 0
ABDOMINAL PAIN: 0

## 2025-08-19 DIAGNOSIS — Z13.228 SCREENING FOR ENDOCRINE, NUTRITIONAL, METABOLIC AND IMMUNITY DISORDER: ICD-10-CM

## 2025-08-19 DIAGNOSIS — Z91.89 RISK OF MYOCARDIAL INFARCTION OR STROKE 7.5% OR GREATER IN NEXT 10 YEARS: ICD-10-CM

## 2025-08-19 DIAGNOSIS — E78.5 TYPE 2 DIABETES MELLITUS WITH HYPERLIPIDEMIA (HCC): ICD-10-CM

## 2025-08-19 DIAGNOSIS — E11.69 TYPE 2 DIABETES MELLITUS WITH OBESITY (HCC): ICD-10-CM

## 2025-08-19 DIAGNOSIS — Z13.29 SCREENING FOR ENDOCRINE, NUTRITIONAL, METABOLIC AND IMMUNITY DISORDER: ICD-10-CM

## 2025-08-19 DIAGNOSIS — D64.9 NORMOCYTIC ANEMIA: ICD-10-CM

## 2025-08-19 DIAGNOSIS — Z13.0 SCREENING FOR ENDOCRINE, NUTRITIONAL, METABOLIC AND IMMUNITY DISORDER: ICD-10-CM

## 2025-08-19 DIAGNOSIS — E11.69 TYPE 2 DIABETES MELLITUS WITH HYPERLIPIDEMIA (HCC): ICD-10-CM

## 2025-08-19 DIAGNOSIS — Z13.21 SCREENING FOR ENDOCRINE, NUTRITIONAL, METABOLIC AND IMMUNITY DISORDER: ICD-10-CM

## 2025-08-19 DIAGNOSIS — E66.9 TYPE 2 DIABETES MELLITUS WITH OBESITY (HCC): ICD-10-CM

## 2025-08-19 DIAGNOSIS — D69.6 THROMBOCYTOPENIA: ICD-10-CM

## 2025-08-19 DIAGNOSIS — E78.00 HYPERCHOLESTEROLEMIA WITH LDL GREATER THAN 190 MG/DL: ICD-10-CM

## 2025-08-20 LAB
ALBUMIN SERPL-MCNC: 4.4 G/DL (ref 3.4–5)
ALBUMIN/GLOB SERPL: 1.8 {RATIO} (ref 1.1–2.2)
ALP SERPL-CCNC: 87 U/L (ref 40–129)
ALT SERPL-CCNC: 20 U/L (ref 10–40)
ANION GAP SERPL CALCULATED.3IONS-SCNC: 10 MMOL/L (ref 3–16)
AST SERPL-CCNC: 18 U/L (ref 15–37)
BASOPHILS # BLD: 0.1 K/UL (ref 0–0.2)
BASOPHILS NFR BLD: 0.9 %
BILIRUB SERPL-MCNC: 0.5 MG/DL (ref 0–1)
BUN SERPL-MCNC: 12 MG/DL (ref 7–20)
CALCIUM SERPL-MCNC: 9.4 MG/DL (ref 8.3–10.6)
CHLORIDE SERPL-SCNC: 102 MMOL/L (ref 99–110)
CHOLEST SERPL-MCNC: 204 MG/DL (ref 0–199)
CO2 SERPL-SCNC: 25 MMOL/L (ref 21–32)
CREAT SERPL-MCNC: 1.1 MG/DL (ref 0.8–1.3)
CREAT UR-MCNC: 320 MG/DL (ref 39–259)
DEPRECATED RDW RBC AUTO: 13.9 % (ref 12.4–15.4)
EOSINOPHIL # BLD: 0.4 K/UL (ref 0–0.6)
EOSINOPHIL NFR BLD: 6 %
EST. AVERAGE GLUCOSE BLD GHB EST-MCNC: 251.8 MG/DL
FERRITIN SERPL IA-MCNC: 220 NG/ML (ref 30–400)
FOLATE SERPL-MCNC: 10.6 NG/ML (ref 4.78–24.2)
GFR SERPLBLD CREATININE-BSD FMLA CKD-EPI: 76 ML/MIN/{1.73_M2}
GLUCOSE SERPL-MCNC: 149 MG/DL (ref 70–99)
HBA1C MFR BLD: 10.4 %
HCT VFR BLD AUTO: 38.1 % (ref 40.5–52.5)
HDLC SERPL-MCNC: 41 MG/DL (ref 40–60)
HGB BLD-MCNC: 12.3 G/DL (ref 13.5–17.5)
HIV 1+2 AB+HIV1 P24 AG SERPL QL IA: NORMAL
HIV 2 AB SERPL QL IA: NORMAL
HIV1 AB SERPL QL IA: NORMAL
HIV1 P24 AG SERPL QL IA: NORMAL
IRON SATN MFR SERPL: 23 % (ref 20–50)
IRON SERPL-MCNC: 81 UG/DL (ref 59–158)
LDLC SERPL CALC-MCNC: 140 MG/DL
LYMPHOCYTES # BLD: 2.7 K/UL (ref 1–5.1)
LYMPHOCYTES NFR BLD: 42.2 %
MCH RBC QN AUTO: 28.2 PG (ref 26–34)
MCHC RBC AUTO-ENTMCNC: 32.3 G/DL (ref 31–36)
MCV RBC AUTO: 87.2 FL (ref 80–100)
MEV IGG SER QL IA: NORMAL
MICROALBUMIN UR DL<=1MG/L-MCNC: 4.22 MG/DL
MICROALBUMIN/CREAT UR: 13.2 MG/G (ref 0–30)
MONOCYTES # BLD: 0.5 K/UL (ref 0–1.3)
MONOCYTES NFR BLD: 8.4 %
MUV IGG SER QL IA: NORMAL
NEUTROPHILS # BLD: 2.7 K/UL (ref 1.7–7.7)
NEUTROPHILS NFR BLD: 42.5 %
PATH INTERP BLD-IMP: NORMAL
PATH INTERP BLD-IMP: NORMAL
PLATELET # BLD AUTO: 101 K/UL (ref 135–450)
PLATELET BLD QL SMEAR: ABNORMAL
PMV BLD AUTO: 11 FL (ref 5–10.5)
POTASSIUM SERPL-SCNC: 4.2 MMOL/L (ref 3.5–5.1)
PROT SERPL-MCNC: 6.9 G/DL (ref 6.4–8.2)
RBC # BLD AUTO: 4.37 M/UL (ref 4.2–5.9)
RUBV IGG SERPL QL IA: NORMAL
SLIDE REVIEW: ABNORMAL
SODIUM SERPL-SCNC: 137 MMOL/L (ref 136–145)
TIBC SERPL-MCNC: 357 UG/DL (ref 260–445)
TRIGL SERPL-MCNC: 117 MG/DL (ref 0–150)
VIT B12 SERPL-MCNC: 876 PG/ML (ref 211–911)
VLDLC SERPL CALC-MCNC: 23 MG/DL
VZV IGG SER QL IA: NORMAL
WBC # BLD AUTO: 6.3 K/UL (ref 4–11)